# Patient Record
Sex: FEMALE | Race: WHITE | NOT HISPANIC OR LATINO
[De-identification: names, ages, dates, MRNs, and addresses within clinical notes are randomized per-mention and may not be internally consistent; named-entity substitution may affect disease eponyms.]

---

## 2017-01-10 ENCOUNTER — APPOINTMENT (OUTPATIENT)
Dept: UROLOGY | Facility: CLINIC | Age: 82
End: 2017-01-10

## 2017-01-10 VITALS
HEART RATE: 75 BPM | SYSTOLIC BLOOD PRESSURE: 159 MMHG | TEMPERATURE: 97.8 F | WEIGHT: 120 LBS | DIASTOLIC BLOOD PRESSURE: 79 MMHG | BODY MASS INDEX: 22.08 KG/M2 | HEIGHT: 62 IN | RESPIRATION RATE: 17 BRPM

## 2017-01-10 DIAGNOSIS — Z85.828 PERSONAL HISTORY OF OTHER MALIGNANT NEOPLASM OF SKIN: ICD-10-CM

## 2017-01-10 DIAGNOSIS — F17.200 NICOTINE DEPENDENCE, UNSPECIFIED, UNCOMPLICATED: ICD-10-CM

## 2017-01-10 DIAGNOSIS — Z85.53 PERSONAL HISTORY OF MALIGNANT NEOPLASM OF RENAL PELVIS: ICD-10-CM

## 2017-01-10 DIAGNOSIS — Z85.528 PERSONAL HISTORY OF OTHER MALIGNANT NEOPLASM OF KIDNEY: ICD-10-CM

## 2017-01-10 DIAGNOSIS — R35.0 FREQUENCY OF MICTURITION: ICD-10-CM

## 2017-01-10 DIAGNOSIS — Z86.79 PERSONAL HISTORY OF OTHER DISEASES OF THE CIRCULATORY SYSTEM: ICD-10-CM

## 2017-01-12 LAB
APPEARANCE: CLEAR
BACTERIA: NEGATIVE
BILIRUBIN URINE: NEGATIVE
BLOOD URINE: NEGATIVE
COLOR: YELLOW
CORE LAB FLUID CYTOLOGY: NORMAL
GLUCOSE QUALITATIVE U: NORMAL MG/DL
HYALINE CASTS: 1 /LPF
KETONES URINE: NEGATIVE
LEUKOCYTE ESTERASE URINE: NEGATIVE
MICROSCOPIC-UA: NORMAL
NITRITE URINE: NEGATIVE
PH URINE: 6
PROTEIN URINE: NEGATIVE MG/DL
RED BLOOD CELLS URINE: 1 /HPF
SPECIFIC GRAVITY URINE: 1.01
SQUAMOUS EPITHELIAL CELLS: 2 /HPF
UROBILINOGEN URINE: NORMAL MG/DL
WHITE BLOOD CELLS URINE: 1 /HPF

## 2017-05-26 ENCOUNTER — APPOINTMENT (OUTPATIENT)
Dept: DERMATOLOGY | Facility: CLINIC | Age: 82
End: 2017-05-26

## 2017-07-26 ENCOUNTER — APPOINTMENT (OUTPATIENT)
Dept: DERMATOLOGY | Facility: CLINIC | Age: 82
End: 2017-07-26

## 2018-05-30 ENCOUNTER — APPOINTMENT (OUTPATIENT)
Dept: DERMATOLOGY | Facility: CLINIC | Age: 83
End: 2018-05-30
Payer: MEDICARE

## 2018-05-30 PROCEDURE — 17000 DESTRUCT PREMALG LESION: CPT

## 2018-05-30 PROCEDURE — 99213 OFFICE O/P EST LOW 20 MIN: CPT | Mod: 25

## 2018-09-18 ENCOUNTER — APPOINTMENT (OUTPATIENT)
Dept: PULMONOLOGY | Facility: CLINIC | Age: 83
End: 2018-09-18
Payer: MEDICARE

## 2018-09-18 VITALS — HEIGHT: 62 IN | BODY MASS INDEX: 20.8 KG/M2 | WEIGHT: 113 LBS

## 2018-09-18 VITALS — HEART RATE: 71 BPM | DIASTOLIC BLOOD PRESSURE: 72 MMHG | OXYGEN SATURATION: 96 % | SYSTOLIC BLOOD PRESSURE: 118 MMHG

## 2018-09-18 VITALS — RESPIRATION RATE: 16 BRPM

## 2018-09-18 DIAGNOSIS — Z03.89 ENCOUNTER FOR OBSERVATION FOR OTHER SUSPECTED DISEASES AND CONDITIONS RULED OUT: ICD-10-CM

## 2018-09-18 PROCEDURE — 94729 DIFFUSING CAPACITY: CPT

## 2018-09-18 PROCEDURE — 94664 DEMO&/EVAL PT USE INHALER: CPT | Mod: 59

## 2018-09-18 PROCEDURE — 94727 GAS DIL/WSHOT DETER LNG VOL: CPT

## 2018-09-18 PROCEDURE — 94060 EVALUATION OF WHEEZING: CPT

## 2018-09-18 PROCEDURE — 85018 HEMOGLOBIN: CPT | Mod: QW

## 2018-09-18 PROCEDURE — 99205 OFFICE O/P NEW HI 60 MIN: CPT | Mod: 25

## 2018-09-18 RX ORDER — NEBIVOLOL HYDROCHLORIDE 20 MG/1
20 TABLET ORAL
Refills: 0 | Status: DISCONTINUED | COMMUNITY
End: 2018-09-18

## 2018-09-21 ENCOUNTER — APPOINTMENT (OUTPATIENT)
Dept: NEUROLOGY | Facility: CLINIC | Age: 83
End: 2018-09-21

## 2018-11-12 ENCOUNTER — APPOINTMENT (OUTPATIENT)
Dept: NUCLEAR MEDICINE | Facility: CLINIC | Age: 83
End: 2018-11-12

## 2019-01-08 ENCOUNTER — OTHER (OUTPATIENT)
Age: 84
End: 2019-01-08

## 2019-02-19 ENCOUNTER — OUTPATIENT (OUTPATIENT)
Dept: OUTPATIENT SERVICES | Facility: HOSPITAL | Age: 84
LOS: 1 days | End: 2019-02-19
Payer: MEDICARE

## 2019-02-19 ENCOUNTER — APPOINTMENT (OUTPATIENT)
Dept: NUCLEAR MEDICINE | Facility: HOSPITAL | Age: 84
End: 2019-02-19
Payer: MEDICARE

## 2019-02-19 DIAGNOSIS — Z00.00 ENCOUNTER FOR GENERAL ADULT MEDICAL EXAMINATION WITHOUT ABNORMAL FINDINGS: ICD-10-CM

## 2019-02-19 PROCEDURE — 78597 LUNG PERFUSION DIFFERENTIAL: CPT | Mod: 26

## 2019-02-19 PROCEDURE — A9540: CPT

## 2019-02-19 PROCEDURE — 78597 LUNG PERFUSION DIFFERENTIAL: CPT

## 2019-06-21 ENCOUNTER — APPOINTMENT (OUTPATIENT)
Dept: DERMATOLOGY | Facility: CLINIC | Age: 84
End: 2019-06-21
Payer: MEDICARE

## 2019-06-21 PROCEDURE — 99214 OFFICE O/P EST MOD 30 MIN: CPT

## 2019-11-04 ENCOUNTER — APPOINTMENT (OUTPATIENT)
Dept: DERMATOLOGY | Facility: CLINIC | Age: 84
End: 2019-11-04

## 2019-11-06 ENCOUNTER — APPOINTMENT (OUTPATIENT)
Dept: DERMATOLOGY | Facility: CLINIC | Age: 84
End: 2019-11-06
Payer: MEDICARE

## 2019-11-06 PROCEDURE — 99214 OFFICE O/P EST MOD 30 MIN: CPT

## 2019-12-31 ENCOUNTER — INPATIENT (INPATIENT)
Facility: HOSPITAL | Age: 84
LOS: 4 days | Discharge: HOME HEALTH | DRG: 871 | End: 2020-01-05
Attending: INTERNAL MEDICINE | Admitting: INTERNAL MEDICINE
Payer: MEDICARE

## 2019-12-31 VITALS
DIASTOLIC BLOOD PRESSURE: 62 MMHG | OXYGEN SATURATION: 95 % | HEIGHT: 61 IN | TEMPERATURE: 98 F | SYSTOLIC BLOOD PRESSURE: 132 MMHG | RESPIRATION RATE: 16 BRPM | HEART RATE: 86 BPM | WEIGHT: 110.89 LBS

## 2019-12-31 DIAGNOSIS — J18.9 PNEUMONIA, UNSPECIFIED ORGANISM: ICD-10-CM

## 2019-12-31 LAB
ALBUMIN SERPL ELPH-MCNC: 2.3 G/DL — LOW (ref 3.3–5)
ALP SERPL-CCNC: 101 U/L — SIGNIFICANT CHANGE UP (ref 40–120)
ALT FLD-CCNC: 9 U/L — LOW (ref 12–78)
ANION GAP SERPL CALC-SCNC: 6 MMOL/L — SIGNIFICANT CHANGE UP (ref 5–17)
APPEARANCE UR: CLEAR — SIGNIFICANT CHANGE UP
APTT BLD: 29.4 SEC — SIGNIFICANT CHANGE UP (ref 27.5–36.3)
AST SERPL-CCNC: 14 U/L — LOW (ref 15–37)
BASOPHILS # BLD AUTO: 0.04 K/UL — SIGNIFICANT CHANGE UP (ref 0–0.2)
BASOPHILS NFR BLD AUTO: 0.6 % — SIGNIFICANT CHANGE UP (ref 0–2)
BILIRUB SERPL-MCNC: 0.4 MG/DL — SIGNIFICANT CHANGE UP (ref 0.2–1.2)
BILIRUB UR-MCNC: NEGATIVE — SIGNIFICANT CHANGE UP
BUN SERPL-MCNC: 9 MG/DL — SIGNIFICANT CHANGE UP (ref 7–23)
CALCIUM SERPL-MCNC: 9.1 MG/DL — SIGNIFICANT CHANGE UP (ref 8.5–10.1)
CHLORIDE SERPL-SCNC: 97 MMOL/L — SIGNIFICANT CHANGE UP (ref 96–108)
CO2 SERPL-SCNC: 25 MMOL/L — SIGNIFICANT CHANGE UP (ref 22–31)
COLOR SPEC: YELLOW — SIGNIFICANT CHANGE UP
CREAT SERPL-MCNC: 0.67 MG/DL — SIGNIFICANT CHANGE UP (ref 0.5–1.3)
DIFF PNL FLD: NEGATIVE — SIGNIFICANT CHANGE UP
EOSINOPHIL # BLD AUTO: 0.16 K/UL — SIGNIFICANT CHANGE UP (ref 0–0.5)
EOSINOPHIL NFR BLD AUTO: 2.6 % — SIGNIFICANT CHANGE UP (ref 0–6)
GLUCOSE SERPL-MCNC: 101 MG/DL — HIGH (ref 70–99)
GLUCOSE UR QL: NEGATIVE MG/DL — SIGNIFICANT CHANGE UP
HCT VFR BLD CALC: 28.8 % — LOW (ref 34.5–45)
HGB BLD-MCNC: 9.2 G/DL — LOW (ref 11.5–15.5)
IMM GRANULOCYTES NFR BLD AUTO: 0.3 % — SIGNIFICANT CHANGE UP (ref 0–1.5)
INR BLD: 1.17 RATIO — HIGH (ref 0.88–1.16)
KETONES UR-MCNC: NEGATIVE — SIGNIFICANT CHANGE UP
LACTATE SERPL-SCNC: 0.7 MMOL/L — SIGNIFICANT CHANGE UP (ref 0.7–2)
LEUKOCYTE ESTERASE UR-ACNC: ABNORMAL
LYMPHOCYTES # BLD AUTO: 0.34 K/UL — LOW (ref 1–3.3)
LYMPHOCYTES # BLD AUTO: 5.4 % — LOW (ref 13–44)
MCHC RBC-ENTMCNC: 27 PG — SIGNIFICANT CHANGE UP (ref 27–34)
MCHC RBC-ENTMCNC: 31.9 GM/DL — LOW (ref 32–36)
MCV RBC AUTO: 84.5 FL — SIGNIFICANT CHANGE UP (ref 80–100)
MONOCYTES # BLD AUTO: 0.67 K/UL — SIGNIFICANT CHANGE UP (ref 0–0.9)
MONOCYTES NFR BLD AUTO: 10.7 % — SIGNIFICANT CHANGE UP (ref 2–14)
NEUTROPHILS # BLD AUTO: 5.02 K/UL — SIGNIFICANT CHANGE UP (ref 1.8–7.4)
NEUTROPHILS NFR BLD AUTO: 80.4 % — HIGH (ref 43–77)
NITRITE UR-MCNC: NEGATIVE — SIGNIFICANT CHANGE UP
NT-PROBNP SERPL-SCNC: 944 PG/ML — HIGH (ref 0–450)
PH UR: 7 — SIGNIFICANT CHANGE UP (ref 5–8)
PLATELET # BLD AUTO: 212 K/UL — SIGNIFICANT CHANGE UP (ref 150–400)
POTASSIUM SERPL-MCNC: 3.7 MMOL/L — SIGNIFICANT CHANGE UP (ref 3.5–5.3)
POTASSIUM SERPL-SCNC: 3.7 MMOL/L — SIGNIFICANT CHANGE UP (ref 3.5–5.3)
PROT SERPL-MCNC: 6.3 GM/DL — SIGNIFICANT CHANGE UP (ref 6–8.3)
PROT UR-MCNC: NEGATIVE MG/DL — SIGNIFICANT CHANGE UP
PROTHROM AB SERPL-ACNC: 13.1 SEC — HIGH (ref 10–12.9)
RAPID RVP RESULT: SIGNIFICANT CHANGE UP
RBC # BLD: 3.41 M/UL — LOW (ref 3.8–5.2)
RBC # FLD: 14.6 % — HIGH (ref 10.3–14.5)
SODIUM SERPL-SCNC: 128 MMOL/L — LOW (ref 135–145)
SP GR SPEC: 1 — LOW (ref 1.01–1.02)
TROPONIN I SERPL-MCNC: 0.29 NG/ML — HIGH (ref 0.01–0.04)
TROPONIN I SERPL-MCNC: 0.32 NG/ML — HIGH (ref 0.01–0.04)
UROBILINOGEN FLD QL: NEGATIVE MG/DL — SIGNIFICANT CHANGE UP
WBC # BLD: 6.25 K/UL — SIGNIFICANT CHANGE UP (ref 3.8–10.5)
WBC # FLD AUTO: 6.25 K/UL — SIGNIFICANT CHANGE UP (ref 3.8–10.5)

## 2019-12-31 PROCEDURE — 85027 COMPLETE CBC AUTOMATED: CPT

## 2019-12-31 PROCEDURE — 94640 AIRWAY INHALATION TREATMENT: CPT

## 2019-12-31 PROCEDURE — 84484 ASSAY OF TROPONIN QUANT: CPT

## 2019-12-31 PROCEDURE — 80048 BASIC METABOLIC PNL TOTAL CA: CPT

## 2019-12-31 PROCEDURE — 84100 ASSAY OF PHOSPHORUS: CPT

## 2019-12-31 PROCEDURE — 83735 ASSAY OF MAGNESIUM: CPT

## 2019-12-31 PROCEDURE — 93306 TTE W/DOPPLER COMPLETE: CPT

## 2019-12-31 PROCEDURE — 80061 LIPID PANEL: CPT

## 2019-12-31 PROCEDURE — 85025 COMPLETE CBC W/AUTO DIFF WBC: CPT

## 2019-12-31 PROCEDURE — 87086 URINE CULTURE/COLONY COUNT: CPT

## 2019-12-31 PROCEDURE — 71046 X-RAY EXAM CHEST 2 VIEWS: CPT | Mod: 26

## 2019-12-31 PROCEDURE — 83036 HEMOGLOBIN GLYCOSYLATED A1C: CPT

## 2019-12-31 PROCEDURE — 97162 PT EVAL MOD COMPLEX 30 MIN: CPT | Mod: GP

## 2019-12-31 PROCEDURE — 97116 GAIT TRAINING THERAPY: CPT | Mod: GP

## 2019-12-31 PROCEDURE — 83010 ASSAY OF HAPTOGLOBIN QUANT: CPT

## 2019-12-31 PROCEDURE — 71250 CT THORAX DX C-: CPT

## 2019-12-31 PROCEDURE — 81001 URINALYSIS AUTO W/SCOPE: CPT

## 2019-12-31 PROCEDURE — 83540 ASSAY OF IRON: CPT

## 2019-12-31 PROCEDURE — 82746 ASSAY OF FOLIC ACID SERUM: CPT

## 2019-12-31 PROCEDURE — 93005 ELECTROCARDIOGRAM TRACING: CPT

## 2019-12-31 PROCEDURE — 85610 PROTHROMBIN TIME: CPT

## 2019-12-31 PROCEDURE — 80053 COMPREHEN METABOLIC PANEL: CPT

## 2019-12-31 PROCEDURE — 82607 VITAMIN B-12: CPT

## 2019-12-31 PROCEDURE — 36415 COLL VENOUS BLD VENIPUNCTURE: CPT

## 2019-12-31 PROCEDURE — 85730 THROMBOPLASTIN TIME PARTIAL: CPT

## 2019-12-31 PROCEDURE — 83550 IRON BINDING TEST: CPT

## 2019-12-31 PROCEDURE — 93010 ELECTROCARDIOGRAM REPORT: CPT

## 2019-12-31 PROCEDURE — 82668 ASSAY OF ERYTHROPOIETIN: CPT

## 2019-12-31 RX ORDER — ASPIRIN/CALCIUM CARB/MAGNESIUM 324 MG
325 TABLET ORAL ONCE
Refills: 0 | Status: COMPLETED | OUTPATIENT
Start: 2019-12-31 | End: 2019-12-31

## 2019-12-31 RX ORDER — SODIUM CHLORIDE 9 MG/ML
1000 INJECTION INTRAMUSCULAR; INTRAVENOUS; SUBCUTANEOUS
Refills: 0 | Status: DISCONTINUED | OUTPATIENT
Start: 2019-12-31 | End: 2020-01-03

## 2019-12-31 RX ORDER — IPRATROPIUM/ALBUTEROL SULFATE 18-103MCG
3 AEROSOL WITH ADAPTER (GRAM) INHALATION EVERY 6 HOURS
Refills: 0 | Status: DISCONTINUED | OUTPATIENT
Start: 2019-12-31 | End: 2020-01-01

## 2019-12-31 RX ORDER — HYDRALAZINE HCL 50 MG
25 TABLET ORAL
Refills: 0 | Status: DISCONTINUED | OUTPATIENT
Start: 2019-12-31 | End: 2020-01-05

## 2019-12-31 RX ORDER — PANTOPRAZOLE SODIUM 20 MG/1
1 TABLET, DELAYED RELEASE ORAL
Qty: 0 | Refills: 0 | DISCHARGE

## 2019-12-31 RX ORDER — AZELASTINE 137 UG/1
1 SPRAY, METERED NASAL
Qty: 0 | Refills: 0 | DISCHARGE

## 2019-12-31 RX ORDER — SODIUM CHLORIDE 9 MG/ML
1500 INJECTION INTRAMUSCULAR; INTRAVENOUS; SUBCUTANEOUS ONCE
Refills: 0 | Status: COMPLETED | OUTPATIENT
Start: 2019-12-31 | End: 2019-12-31

## 2019-12-31 RX ORDER — ACETAMINOPHEN 500 MG
650 TABLET ORAL EVERY 6 HOURS
Refills: 0 | Status: DISCONTINUED | OUTPATIENT
Start: 2019-12-31 | End: 2020-01-05

## 2019-12-31 RX ORDER — LISINOPRIL 2.5 MG/1
1 TABLET ORAL
Qty: 0 | Refills: 0 | DISCHARGE

## 2019-12-31 RX ORDER — ASPIRIN/CALCIUM CARB/MAGNESIUM 324 MG
1 TABLET ORAL
Qty: 0 | Refills: 0 | DISCHARGE

## 2019-12-31 RX ORDER — ASPIRIN/CALCIUM CARB/MAGNESIUM 324 MG
81 TABLET ORAL DAILY
Refills: 0 | Status: DISCONTINUED | OUTPATIENT
Start: 2019-12-31 | End: 2020-01-05

## 2019-12-31 RX ORDER — HYDRALAZINE HCL 50 MG
1 TABLET ORAL
Qty: 0 | Refills: 0 | DISCHARGE

## 2019-12-31 RX ORDER — PANTOPRAZOLE SODIUM 20 MG/1
40 TABLET, DELAYED RELEASE ORAL
Refills: 0 | Status: DISCONTINUED | OUTPATIENT
Start: 2019-12-31 | End: 2020-01-05

## 2019-12-31 RX ORDER — ALPRAZOLAM 0.25 MG
0.5 TABLET ORAL ONCE
Refills: 0 | Status: DISCONTINUED | OUTPATIENT
Start: 2019-12-31 | End: 2020-01-01

## 2019-12-31 RX ORDER — LISINOPRIL 2.5 MG/1
20 TABLET ORAL DAILY
Refills: 0 | Status: DISCONTINUED | OUTPATIENT
Start: 2019-12-31 | End: 2020-01-05

## 2019-12-31 RX ADMIN — SODIUM CHLORIDE 1500 MILLILITER(S): 9 INJECTION INTRAMUSCULAR; INTRAVENOUS; SUBCUTANEOUS at 15:40

## 2019-12-31 RX ADMIN — SODIUM CHLORIDE 75 MILLILITER(S): 9 INJECTION INTRAMUSCULAR; INTRAVENOUS; SUBCUTANEOUS at 21:05

## 2019-12-31 RX ADMIN — SODIUM CHLORIDE 1500 MILLILITER(S): 9 INJECTION INTRAMUSCULAR; INTRAVENOUS; SUBCUTANEOUS at 18:02

## 2019-12-31 RX ADMIN — Medication 325 MILLIGRAM(S): at 18:02

## 2019-12-31 NOTE — H&P ADULT - ASSESSMENT
83 y/o F with PMH of HTN, CVA, renal cancer s/p nephrectomy, lung cancer s/p chemo (last chemo was 5/21/2019 and last immunotherapy was 11/7/19), COPD, p/w cough    *Sepsis (fever of 101.7 at home and RR >30) 2/2 Community acquired PNA  -Ceftriaxone / zithromax  -F/u blood cultures  -RVP negative  -IVF   -Duonebs for COPD    *Microcytic anemia  -No signs / symptoms of bleeding  -F/u outpatient for further work up    *Hyponatremia  -IVF and re-check Na     *HTN / CVA  -C/w home meds and f/u outpatient for further management        *DVT ppx  -Lovenox 83 y/o F with PMH of HTN, CVA, renal cancer s/p nephrectomy, lung cancer s/p chemo (last chemo was 5/21/2019 and last immunotherapy was 11/7/19), COPD, p/w cough    *Sepsis (fever of 101.7 at home and RR >30) 2/2 Community acquired PNA  -Ceftriaxone / zithromax  -F/u blood cultures  -RVP negative  -IVF   -Duonebs for COPD    *Elevated troponins - R/o ACS   -Denies CP   -Possibly 2/2 demand ischemia   -Will trend troponin, if #2 trends up significantly -> will consider starting anticoagulation, will monitor for now  -Cardio consult  -Echo  -S/p ASA  -Tele monitoring     *Microcytic anemia  -No signs / symptoms of bleeding  -F/u outpatient for further work up    *Hyponatremia  -IVF and re-check Na     *HTN / CVA  -C/w home meds and f/u outpatient for further management        *DVT ppx  -Lovenox

## 2019-12-31 NOTE — ED PROVIDER NOTE - PROGRESS NOTE DETAILS
I Mercedes Foley attest that this documentation has been prepared under the direction and in the presence of Doctor Frost.

## 2019-12-31 NOTE — ED PROVIDER NOTE - OBJECTIVE STATEMENT
83 y/o f with PMHx of HTN, CVA, lung CA s/p Chemo, currently on immunotherapy (last 11/7) presenting to the ED BIBA c/o cough, fever x4 days. Tmax of 101.7 x2 days ago. Cough productive of yellow sputum. Pt states she feels like she is dehydrated. Pt seen at Dr. Kong's office today and sent to ED for further eval. Denies weakness, n/v/d, abd pain. PCP: Dr. Lilly in [place]. Former smoker. Pt received this season's influenza shot. Pt currently on Lisinopril 20mg daily, Hydralazine HCL 25mg BID, Alprazolam 0.5mg TID, Pantoprazole 40mg daily. Allergic to Penicillin, IV contrast, Sulfur, extreme sensitivity to Pain meds, antihistamines, steroids.

## 2019-12-31 NOTE — ED PROVIDER NOTE - CARE PLAN
Principal Discharge DX:	CAP (community acquired pneumonia) Principal Discharge DX:	CAP (community acquired pneumonia)  Secondary Diagnosis:	Troponin I above reference range

## 2019-12-31 NOTE — ED ADULT NURSE REASSESSMENT NOTE - NS ED NURSE REASSESS COMMENT FT1
Assumed care of patient from KLAUS Fatima at 2250. Patient AxOx4. Patient resting comfortably in bed, denies pain at this time. Family at bedside. Patient in no acute signs of distress. All needs addressed at this time. Safety and comfort maintained. Will continue to monitor.

## 2019-12-31 NOTE — ED PROVIDER NOTE - CLINICAL SUMMARY MEDICAL DECISION MAKING FREE TEXT BOX
Pt with hx of lung CA, here with cough, fever and weakness since Sunday, plan for labs, CXR, hydration.

## 2019-12-31 NOTE — H&P ADULT - HISTORY OF PRESENT ILLNESS
83 y/o F with PMH of HTN, CVA, renal cancer s/p nephrectomy, lung cancer s/p chemo (last chemo was 5/21/2019 and last immunotherapy was 11/7/19), COPD, p/w cough. Cough started 4 days ago and is productive of yellow sputum. Also c/o sore throat and chills. Had a tmax of 101.7 at home. Has mild SOB intermittently and some palpitations. Denies CP, nausea, vomiting, diaphoresis, dysuria, increased frequency    States she took baby ASA at home, and was given more ASA in ED.    PSH: Nephrectomy    Social Hx: Former smoker, denies etoh / drugs, lives at home with  and daughter    Family Hx: Sister - lung cancer

## 2019-12-31 NOTE — ED ADULT NURSE NOTE - OBJECTIVE STATEMENT
Pt BIBEMS from Dr Kong's office for flu-like symptoms, fever, weakness since sunday. pt has a hx of lung CA

## 2020-01-01 LAB
ALBUMIN SERPL ELPH-MCNC: 2.2 G/DL — LOW (ref 3.3–5)
ALP SERPL-CCNC: 96 U/L — SIGNIFICANT CHANGE UP (ref 40–120)
ALT FLD-CCNC: 11 U/L — LOW (ref 12–78)
ANION GAP SERPL CALC-SCNC: 5 MMOL/L — SIGNIFICANT CHANGE UP (ref 5–17)
APTT BLD: 27.4 SEC — LOW (ref 27.5–36.3)
AST SERPL-CCNC: 10 U/L — LOW (ref 15–37)
BASOPHILS # BLD AUTO: 0.04 K/UL — SIGNIFICANT CHANGE UP (ref 0–0.2)
BASOPHILS NFR BLD AUTO: 0.8 % — SIGNIFICANT CHANGE UP (ref 0–2)
BILIRUB SERPL-MCNC: 0.3 MG/DL — SIGNIFICANT CHANGE UP (ref 0.2–1.2)
BUN SERPL-MCNC: 6 MG/DL — LOW (ref 7–23)
CALCIUM SERPL-MCNC: 9.1 MG/DL — SIGNIFICANT CHANGE UP (ref 8.5–10.1)
CHLORIDE SERPL-SCNC: 107 MMOL/L — SIGNIFICANT CHANGE UP (ref 96–108)
CHOLEST SERPL-MCNC: 179 MG/DL — SIGNIFICANT CHANGE UP (ref 10–199)
CO2 SERPL-SCNC: 24 MMOL/L — SIGNIFICANT CHANGE UP (ref 22–31)
CREAT SERPL-MCNC: 0.63 MG/DL — SIGNIFICANT CHANGE UP (ref 0.5–1.3)
CULTURE RESULTS: SIGNIFICANT CHANGE UP
EOSINOPHIL # BLD AUTO: 0.1 K/UL — SIGNIFICANT CHANGE UP (ref 0–0.5)
EOSINOPHIL NFR BLD AUTO: 1.9 % — SIGNIFICANT CHANGE UP (ref 0–6)
GLUCOSE SERPL-MCNC: 86 MG/DL — SIGNIFICANT CHANGE UP (ref 70–99)
HBA1C BLD-MCNC: 5.3 % — SIGNIFICANT CHANGE UP (ref 4–5.6)
HCT VFR BLD CALC: 30 % — LOW (ref 34.5–45)
HDLC SERPL-MCNC: 41 MG/DL — LOW
HGB BLD-MCNC: 9.5 G/DL — LOW (ref 11.5–15.5)
IMM GRANULOCYTES NFR BLD AUTO: 0.6 % — SIGNIFICANT CHANGE UP (ref 0–1.5)
INR BLD: 1.27 RATIO — HIGH (ref 0.88–1.16)
LIPID PNL WITH DIRECT LDL SERPL: 123 MG/DL — HIGH
LYMPHOCYTES # BLD AUTO: 0.25 K/UL — LOW (ref 1–3.3)
LYMPHOCYTES # BLD AUTO: 4.8 % — LOW (ref 13–44)
MAGNESIUM SERPL-MCNC: 2 MG/DL — SIGNIFICANT CHANGE UP (ref 1.6–2.6)
MCHC RBC-ENTMCNC: 27.2 PG — SIGNIFICANT CHANGE UP (ref 27–34)
MCHC RBC-ENTMCNC: 31.7 GM/DL — LOW (ref 32–36)
MCV RBC AUTO: 86 FL — SIGNIFICANT CHANGE UP (ref 80–100)
MONOCYTES # BLD AUTO: 0.59 K/UL — SIGNIFICANT CHANGE UP (ref 0–0.9)
MONOCYTES NFR BLD AUTO: 11.3 % — SIGNIFICANT CHANGE UP (ref 2–14)
NEUTROPHILS # BLD AUTO: 4.19 K/UL — SIGNIFICANT CHANGE UP (ref 1.8–7.4)
NEUTROPHILS NFR BLD AUTO: 80.6 % — HIGH (ref 43–77)
PHOSPHATE SERPL-MCNC: 2.9 MG/DL — SIGNIFICANT CHANGE UP (ref 2.5–4.5)
PLATELET # BLD AUTO: 237 K/UL — SIGNIFICANT CHANGE UP (ref 150–400)
POTASSIUM SERPL-MCNC: 3.5 MMOL/L — SIGNIFICANT CHANGE UP (ref 3.5–5.3)
POTASSIUM SERPL-SCNC: 3.5 MMOL/L — SIGNIFICANT CHANGE UP (ref 3.5–5.3)
PROT SERPL-MCNC: 5.8 GM/DL — LOW (ref 6–8.3)
PROTHROM AB SERPL-ACNC: 14.2 SEC — HIGH (ref 10–12.9)
RBC # BLD: 3.49 M/UL — LOW (ref 3.8–5.2)
RBC # FLD: 14.7 % — HIGH (ref 10.3–14.5)
SODIUM SERPL-SCNC: 136 MMOL/L — SIGNIFICANT CHANGE UP (ref 135–145)
SPECIMEN SOURCE: SIGNIFICANT CHANGE UP
TOTAL CHOLESTEROL/HDL RATIO MEASUREMENT: 4.4 RATIO — SIGNIFICANT CHANGE UP (ref 3.3–7.1)
TRIGL SERPL-MCNC: 73 MG/DL — SIGNIFICANT CHANGE UP (ref 10–149)
TROPONIN I SERPL-MCNC: 0.1 NG/ML — HIGH (ref 0.01–0.04)
TROPONIN I SERPL-MCNC: 0.11 NG/ML — HIGH (ref 0.01–0.04)
TROPONIN I SERPL-MCNC: 0.3 NG/ML — HIGH (ref 0.01–0.04)
WBC # BLD: 5.2 K/UL — SIGNIFICANT CHANGE UP (ref 3.8–10.5)
WBC # FLD AUTO: 5.2 K/UL — SIGNIFICANT CHANGE UP (ref 3.8–10.5)

## 2020-01-01 PROCEDURE — 71250 CT THORAX DX C-: CPT | Mod: 26

## 2020-01-01 PROCEDURE — 93010 ELECTROCARDIOGRAM REPORT: CPT

## 2020-01-01 RX ORDER — CEFEPIME 1 G/1
INJECTION, POWDER, FOR SOLUTION INTRAMUSCULAR; INTRAVENOUS
Refills: 0 | Status: DISCONTINUED | OUTPATIENT
Start: 2020-01-01 | End: 2020-01-02

## 2020-01-01 RX ORDER — IPRATROPIUM BROMIDE 0.2 MG/ML
500 SOLUTION, NON-ORAL INHALATION EVERY 6 HOURS
Refills: 0 | Status: DISCONTINUED | OUTPATIENT
Start: 2020-01-01 | End: 2020-01-05

## 2020-01-01 RX ORDER — ATORVASTATIN CALCIUM 80 MG/1
10 TABLET, FILM COATED ORAL AT BEDTIME
Refills: 0 | Status: DISCONTINUED | OUTPATIENT
Start: 2020-01-01 | End: 2020-01-04

## 2020-01-01 RX ORDER — CEFEPIME 1 G/1
INJECTION, POWDER, FOR SOLUTION INTRAMUSCULAR; INTRAVENOUS
Refills: 0 | Status: DISCONTINUED | OUTPATIENT
Start: 2020-01-01 | End: 2020-01-01

## 2020-01-01 RX ORDER — CEFEPIME 1 G/1
500 INJECTION, POWDER, FOR SOLUTION INTRAMUSCULAR; INTRAVENOUS ONCE
Refills: 0 | Status: COMPLETED | OUTPATIENT
Start: 2020-01-01 | End: 2020-01-01

## 2020-01-01 RX ORDER — AZITHROMYCIN 500 MG/1
500 TABLET, FILM COATED ORAL DAILY
Refills: 0 | Status: COMPLETED | OUTPATIENT
Start: 2020-01-01 | End: 2020-01-03

## 2020-01-01 RX ORDER — CEFEPIME 1 G/1
500 INJECTION, POWDER, FOR SOLUTION INTRAMUSCULAR; INTRAVENOUS EVERY 12 HOURS
Refills: 0 | Status: DISCONTINUED | OUTPATIENT
Start: 2020-01-02 | End: 2020-01-02

## 2020-01-01 RX ORDER — ALPRAZOLAM 0.25 MG
0.25 TABLET ORAL THREE TIMES A DAY
Refills: 0 | Status: DISCONTINUED | OUTPATIENT
Start: 2020-01-01 | End: 2020-01-04

## 2020-01-01 RX ORDER — CEFEPIME 1 G/1
500 INJECTION, POWDER, FOR SOLUTION INTRAMUSCULAR; INTRAVENOUS ONCE
Refills: 0 | Status: DISCONTINUED | OUTPATIENT
Start: 2020-01-01 | End: 2020-01-01

## 2020-01-01 RX ADMIN — PANTOPRAZOLE SODIUM 40 MILLIGRAM(S): 20 TABLET, DELAYED RELEASE ORAL at 05:53

## 2020-01-01 RX ADMIN — Medication 0.25 MILLIGRAM(S): at 16:19

## 2020-01-01 RX ADMIN — CEFEPIME 100 MILLIGRAM(S): 1 INJECTION, POWDER, FOR SOLUTION INTRAMUSCULAR; INTRAVENOUS at 14:37

## 2020-01-01 RX ADMIN — Medication 0.25 MILLIGRAM(S): at 21:47

## 2020-01-01 RX ADMIN — Medication 30 MILLILITER(S): at 16:50

## 2020-01-01 RX ADMIN — ATORVASTATIN CALCIUM 10 MILLIGRAM(S): 80 TABLET, FILM COATED ORAL at 21:48

## 2020-01-01 RX ADMIN — LISINOPRIL 20 MILLIGRAM(S): 2.5 TABLET ORAL at 05:53

## 2020-01-01 RX ADMIN — Medication 25 MILLIGRAM(S): at 05:54

## 2020-01-01 RX ADMIN — Medication 3 MILLILITER(S): at 15:38

## 2020-01-01 RX ADMIN — Medication 25 MILLIGRAM(S): at 00:11

## 2020-01-01 RX ADMIN — Medication 81 MILLIGRAM(S): at 13:32

## 2020-01-01 RX ADMIN — Medication 0.25 MILLIGRAM(S): at 09:54

## 2020-01-01 RX ADMIN — Medication 0.5 MILLIGRAM(S): at 00:12

## 2020-01-01 RX ADMIN — Medication 25 MILLIGRAM(S): at 16:50

## 2020-01-01 NOTE — CONSULT NOTE ADULT - ASSESSMENT
A/P: 85 y/o F with PMH of HTN, CVA, renal cancer s/p nephrectomy, lung cancer s/p chemo (last chemo was 5/21/2019 and last immunotherapy was 11/7/19),   COPD, p/w cough.    1. PNA/sepsis. Cont abx. Cx pending. RVP negative.   Encourage fluids. Mgmt as per primary team.     2. +Troponins. Likely demand ischemia in setting of PNA.   No active CP or dynamic EKG changes.   Cont asa. Tele monitoring for 24 hrs.   Avoid Bbl with COPD. Check lipids.   Can check 2Decho to assess LV fxn.   Will have outpt ischemic eval when more stable, after infection cleared.     3. HTN. Stable, cont current regimen.     4. H/o CVA. Cont asa. Would also start low dose statin.    5. DVT proph.

## 2020-01-01 NOTE — CONSULT NOTE ADULT - SUBJECTIVE AND OBJECTIVE BOX
Cardiology Consultation    HPI: 83 y/o F with PMH of HTN, CVA, renal cancer s/p nephrectomy, lung cancer s/p chemo (last chemo was 2019 and last immunotherapy was 19), COPD, p/w cough. Cough started 4 days ago and is productive of yellow sputum. Also c/o sore throat and chills. Had a tmax of 101.7 at home. Has mild SOB intermittently and some palpitations. Denies CP, nausea, vomiting, diaphoresis, dysuria, increased frequency. States she took baby ASA at home, and was given more ASA in ED.    . Case d/w daughter and pt at bedside. +Cough, SOB, fevers. No CP.   SR on tele.     PSH: Nephrectomy    Social Hx: Former smoker, denies etoh / drugs, lives at home with  and daughter    Family Hx: Sister - lung cancer (31 Dec 2019 20:12)    Allergies  antihistamines (Unknown)  IV Contrast (Unknown)  PC Pen VK (Unknown)  sulfa drugs (Unknown)    SOCIAL HISTORY: Denies tobacco, etoh abuse or illicit drug use    MEDICATIONS  (STANDING):  albuterol/ipratropium for Nebulization 3 milliLiter(s) Nebulizer every 6 hours  aspirin enteric coated 81 milliGRAM(s) Oral daily  hydrALAZINE 25 milliGRAM(s) Oral two times a day  levoFLOXacin IVPB 500 milliGRAM(s) IV Intermittent every 24 hours  lisinopril 20 milliGRAM(s) Oral daily  pantoprazole    Tablet 40 milliGRAM(s) Oral before breakfast  sodium chloride 0.9%. 1000 milliLiter(s) (75 mL/Hr) IV Continuous <Continuous>    MEDICATIONS  (PRN):  acetaminophen   Tablet .. 650 milliGRAM(s) Oral every 6 hours PRN Mild Pain (1 - 3)  ALPRAZolam 0.25 milliGRAM(s) Oral three times a day PRN anxiety  artificial  tears Solution 1 Drop(s) Both EYES three times a day PRN Dry Eyes      Vital Signs Last 24 Hrs  T(C): 36.7 (2020 05:33), Max: 37.9 (31 Dec 2019 23:45)  T(F): 98.1 (2020 05:33), Max: 100.2 (31 Dec 2019 23:45)  HR: 90 (2020 05:33) (71 - 101)  BP: 125/53 (2020 05:33) (123/55 - 152/57)  BP(mean): 77 (2020 01:50) (77 - 77)  RR: 15 (2020 05:33) (15 - 21)  SpO2: 99% (2020 05:33) (92% - 100%)    REVIEW OF SYSTEMS:    CONSTITUTIONAL:  As per HPI.  HEENT:  Eyes:  No diplopia or blurred vision. ENT:  No earache, sore throat or runny nose.  CARDIOVASCULAR:  No pressure, squeezing, strangling, tightness, heaviness or aching about the chest, neck, axilla or epigastrium.  RESPIRATORY:  +cough, SOB.  GASTROINTESTINAL:  No nausea, vomiting or diarrhea.  GENITOURINARY:  No dysuria, frequency or urgency.  MUSCULOSKELETAL:  As per HPI.  NEUROLOGIC:  No paresthesias, fasciculations, seizures or weakness.    PHYSICAL EXAMINATION:    GENERAL APPEARANCE:  Pt. is not currently dyspneic, in no distress. Pt. is alert, oriented, and pleasant.  HEENT:  Pupils are normal and react normally. No icterus. Mucous membranes well colored.  NECK:  Supple. No lymphadenopathy. Jugular venous pressure not elevated. Carotids equal.   HEART:   The cardiac impulse has a normal quality. There are no murmurs, rubs or gallops noted  CHEST:  Decreased BS b/l.   ABDOMEN:  Soft and nontender.   EXTREMITIES:  There is no edema.     LABS:                        9.5    5.20  )-----------( 237      ( 2020 07:46 )             30.0         136  |  107  |  6<L>  ----------------------------<  86  3.5   |  24  |  0.63    Ca    9.1      2020 07:46  Phos  2.9       Mg     2.0         TPro  5.8<L>  /  Alb  2.2<L>  /  TBili  0.3  /  DBili  x   /  AST  10<L>  /  ALT  11<L>  /  AlkPhos  96      LIVER FUNCTIONS - ( 2020 07:46 )  Alb: 2.2 g/dL / Pro: 5.8 gm/dL / ALK PHOS: 96 U/L / ALT: 11 U/L / AST: 10 U/L / GGT: x           PT/INR - ( 2020 07:46 )   PT: 14.2 sec;   INR: 1.27 ratio       PTT - ( 2020 07:46 )  PTT:27.4 sec  CARDIAC MARKERS ( 31 Dec 2019 23:05 )  0.299 ng/mL / x     / x     / x     / x      CARDIAC MARKERS ( 31 Dec 2019 19:57 )  0.287 ng/mL / x     / x     / x     / x      CARDIAC MARKERS ( 31 Dec 2019 15:40 )  0.316 ng/mL / x     / x     / x     / x        Urinalysis Basic - ( 31 Dec 2019 18:05 )    Color: Yellow / Appearance: Clear / S.005 / pH: x  Gluc: x / Ketone: Negative  / Bili: Negative / Urobili: Negative mg/dL   Blood: x / Protein: Negative mg/dL / Nitrite: Negative   Leuk Esterase: Trace / RBC: 0-2 /HPF / WBC 0-2   Sq Epi: x / Non Sq Epi: x / Bacteria: x    EKG: SR @ 82, no dynamic ST changes.     TELEMETRY: SR    CARDIAC TESTS: pending    RADIOLOGY & ADDITIONAL STUDIES:   < from: Xray Chest 2 Views PA/Lat (19 @ 15:31) >  IMPRESSION: Posterior infiltrate, possibly inthe left lower lobe.    ASSESSMENT & PLAN:

## 2020-01-01 NOTE — CONSULT NOTE ADULT - SUBJECTIVE AND OBJECTIVE BOX
HPI:    84 y.o.f with PMH of HTN, CVA, renal cancer s/p nephrectomy, lung cancer s/p chemo (last chemo was 2019 and last immunotherapy was 19), COPD, pat admitted with cough which started 4 days ago and is productive of yellow sputum. Also c/o sore throat and chills. Had a tmax of 101.7 at home. Has mild SOB intermittently and some palpitations. Denies CP, nausea, vomiting, diaphoresis, dysuria, increased frequency, pat seen for pulmonary, pat usually fu with pulmonologist in [place].    PSH: Nephrectomy    Social Hx: Former smoker, denies etoh / drugs, lives at home with  and daughter    Family Hx: Sister - lung cancer (31 Dec 2019 20:12)      PAST MEDICAL & SURGICAL HISTORY:      Home Medications:  ALPRAZolam 0.5 mg oral tablet: 1 tab(s) orally 3 times a day (31 Dec 2019 20:13)  Aspir 81 oral delayed release tablet: 1 tab(s) orally once a day (31 Dec 2019 20:13)  azelastine 137 mcg/inh (0.1%) nasal spray: 1 spray(s) nasal 2 times a day, As Needed (31 Dec 2019 20:13)  hydrALAZINE 25 mg oral tablet: 1 tab(s) orally 2 times a day (31 Dec 2019 20:13)  lisinopril 20 mg oral tablet: 1 tab(s) orally once a day (31 Dec 2019 20:13)  pantoprazole 40 mg oral delayed release tablet: 1 tab(s) orally once a day (31 Dec 2019 20:13)  Refresh ophthalmic solution: 1 drop(s) to each affected eye 3 times a day, As Needed (31 Dec 2019 20:13)      MEDICATIONS  (STANDING):  albuterol/ipratropium for Nebulization 3 milliLiter(s) Nebulizer every 6 hours  aspirin enteric coated 81 milliGRAM(s) Oral daily  atorvastatin 10 milliGRAM(s) Oral at bedtime  cefepime   IVPB      cefepime   IVPB 500 milliGRAM(s) IV Intermittent once  hydrALAZINE 25 milliGRAM(s) Oral two times a day  lisinopril 20 milliGRAM(s) Oral daily  pantoprazole    Tablet 40 milliGRAM(s) Oral before breakfast  sodium chloride 0.9%. 1000 milliLiter(s) (75 mL/Hr) IV Continuous <Continuous>    MEDICATIONS  (PRN):  acetaminophen   Tablet .. 650 milliGRAM(s) Oral every 6 hours PRN Mild Pain (1 - 3)  ALPRAZolam 0.25 milliGRAM(s) Oral three times a day PRN anxiety  artificial  tears Solution 1 Drop(s) Both EYES three times a day PRN Dry Eyes      Allergies    antihistamines (Unknown)  IV Contrast (Unknown)  PC Pen VK (Unknown)  sulfa drugs (Unknown)    Intolerances    predniSONE (Unknown)      SOCIAL HISTORY: Denies tobacco, etoh abuse or illicit drug use    FAMILY HISTORY:      Vital Signs Last 24 Hrs  T(C): 37.1 (2020 11:08), Max: 37.9 (31 Dec 2019 23:45)  T(F): 98.7 (2020 11:08), Max: 100.2 (31 Dec 2019 23:45)  HR: 102 (2020 11:08) (71 - 102)  BP: 130/54 (2020 11:08) (123/55 - 152/57)  BP(mean): 77 (2020 01:50) (77 - 77)  RR: 18 (2020 11:08) (15 - 21)  SpO2: 99% (2020 11:08) (92% - 100%)        REVIEW OF SYSTEMS:    CONSTITUTIONAL:  As per HPI.  HEENT:  Eyes:  No diplopia or blurred vision. ENT:  No earache, sore throat or runny nose.  CARDIOVASCULAR:  No pressure, squeezing, tightness, heaviness or aching about the chest, neck, axilla or epigastrium.  RESPIRATORY:  No cough, shortness of breath, PND or orthopnea.  GASTROINTESTINAL:  No nausea, vomiting or diarrhea.  GENITOURINARY:  No dysuria, frequency or urgency.  MUSCULOSKELETAL:  As per HPI.  SKIN:  No change in skin, hair or nails.  NEUROLOGIC:  No paresthesias, fasciculations, seizures or weakness.  PSYCHIATRIC:  No disorder of thought or mood.  ENDOCRINE:  No heat or cold intolerance, polyuria or polydipsia.  HEMATOLOGICAL:  No easy bruising or bleedings:  .     PHYSICAL EXAMINATION:    GENERAL APPEARANCE:  Pt. is not currently dyspneic, in no distress. Pt. is alert, oriented, and pleasant.  HEENT:  Pupils are normal and react normally. No icterus. Mucous membranes well colored.  NECK:  Supple. No lymphadenopathy. Jugular venous pressure not elevated. Carotids equal.   HEART:   The cardiac impulse has a normal quality. Regular. Normal S1 and S2. There are no murmurs, rubs or gallops noted  CHEST:  Chest crackles to auscultation. Normal respiratory effort.  ABDOMEN:  Soft and nontender.   EXTREMITIES:  There is no cyanosis, clubbing or edema.   SKIN:  No rash or significant lesions are noted.    LABS:                        9.5    5.20  )-----------( 237      ( 2020 07:46 )             30.0         136  |  107  |  6<L>  ----------------------------<  86  3.5   |  24  |  0.63    Ca    9.1      2020 07:46  Phos  2.9       Mg     2.0         TPro  5.8<L>  /  Alb  2.2<L>  /  TBili  0.3  /  DBili  x   /  AST  10<L>  /  ALT  11<L>  /  AlkPhos  96      LIVER FUNCTIONS - ( 2020 07:46 )  Alb: 2.2 g/dL / Pro: 5.8 gm/dL / ALK PHOS: 96 U/L / ALT: 11 U/L / AST: 10 U/L / GGT: x           PT/INR - ( 2020 07:46 )   PT: 14.2 sec;   INR: 1.27 ratio         PTT - ( 2020 07:46 )  PTT:27.4 sec  CARDIAC MARKERS ( 31 Dec 2019 23:05 )  0.299 ng/mL / x     / x     / x     / x      CARDIAC MARKERS ( 31 Dec 2019 19:57 )  0.287 ng/mL / x     / x     / x     / x      CARDIAC MARKERS ( 31 Dec 2019 15:40 )  0.316 ng/mL / x     / x     / x     / x          Urinalysis Basic - ( 31 Dec 2019 18:05 )    Color: Yellow / Appearance: Clear / S.005 / pH: x  Gluc: x / Ketone: Negative  / Bili: Negative / Urobili: Negative mg/dL   Blood: x / Protein: Negative mg/dL / Nitrite: Negative   Leuk Esterase: Trace / RBC: 0-2 /HPF / WBC 0-2   Sq Epi: x / Non Sq Epi: x / Bacteria: x    RADIOLOGY & ADDITIONAL STUDIES:     Chest 2 Views PA/Lat (12.31.19 @ 15:31) >  IMPRESSION: Posterior infiltrate, possibly inthe left lower lobe.

## 2020-01-01 NOTE — PROGRESS NOTE ADULT - SUBJECTIVE AND OBJECTIVE BOX
HOSPITALIST PROGRESS NOTE:  SUBJECTIVE:  PCP:  Chief Complaint: Patient is a 84y old  Female who presents with a chief complaint of cough (31 Dec 2019 20:12)      HPI:  83 y/o F with PMH of HTN, CVA, renal cancer s/p nephrectomy, lung cancer s/p chemo (last chemo was 2019 and last immunotherapy was 19), COPD, p/w cough. Cough started 4 days ago and is productive of yellow sputum. Also c/o sore throat and chills. Had a tmax of 101.7 at home. Has mild SOB intermittently and some palpitations. Denies CP, nausea, vomiting, diaphoresis, dysuria, increased frequency  States she took baby ASA at home, and was given more ASA in ED.    20: Above reviewed.     Allergies:  antihistamines (Unknown)  IV Contrast (Unknown)  PC Pen VK (Unknown)  predniSONE (Unknown)  sulfa drugs (Unknown)    REVIEW OF SYSTEMS:  See HPI. All other review of systems is negative unless indicated above.     OBJECTIVE  Physical Exam:  Vital Signs:  Height (cm): 154.94 ( @ 14:22)  Weight (kg): 52.6 ( @ 04:10)  BMI (kg/m2): 21.9 ( @ 04:10)  BSA (m2): 1.5 ( @ 04:10)  Vital Signs Last 24 Hrs  T(C): 36.7 (2020 05:33), Max: 37.9 (31 Dec 2019 23:45)  T(F): 98.1 (2020 05:33), Max: 100.2 (31 Dec 2019 23:45)  HR: 90 (2020 05:33) (71 - 101)  BP: 125/53 (2020 05:33) (123/55 - 152/57)  BP(mean): 77 (2020 01:50) (77 - 77)  RR: 15 (2020 05:33) (15 - 21)  SpO2: 99% (2020 05:33) (92% - 100%)  I&O's Summary      Constitutional: NAD, awake and alert, well-developed  Neurological: AAO x 3, no focal deficits  HEENT: PERRLA, EOMI, MMM  Neck: Soft and supple, No LAD, No JVD  Respiratory: Breath sounds are clear bilaterally, No wheezing, rales or rhonchi  Cardiovascular: S1 and S2, regular rate and rhythm; no Murmurs, gallops or rubs  Gastrointestinal: Bowel Sounds present, soft, nontender, nondistended, no guarding, no rebound tenderness  Back: No CVA tenderness   Extremities: No peripheral edema  Vascular: 2+ peripheral pulses  Musculoskeletal: 5/5 strength b/l upper and lower extremities  Skin: No rashes  Breast: Deferred  Rectal: Deferred    MEDICATIONS  (STANDING):  albuterol/ipratropium for Nebulization 3 milliLiter(s) Nebulizer every 6 hours  aspirin enteric coated 81 milliGRAM(s) Oral daily  hydrALAZINE 25 milliGRAM(s) Oral two times a day  levoFLOXacin IVPB 500 milliGRAM(s) IV Intermittent every 24 hours  lisinopril 20 milliGRAM(s) Oral daily  pantoprazole    Tablet 40 milliGRAM(s) Oral before breakfast  sodium chloride 0.9%. 1000 milliLiter(s) (75 mL/Hr) IV Continuous <Continuous>      LABS: All Labs Reviewed:                        9.5    5.20  )-----------( 237      ( 2020 07:46 )             30.0     01    136  |  107  |  6<L>  ----------------------------<  86  3.5   |  24  |  0.63    Ca    9.1      2020 07:46  Phos  2.9       Mg     2.0         TPro  5.8<L>  /  Alb  2.2<L>  /  TBili  0.3  /  DBili  x   /  AST  10<L>  /  ALT  11<L>  /  AlkPhos  96      PT/INR - ( 2020 07:46 )   PT: 14.2 sec;   INR: 1.27 ratio         PTT - ( 2020 07:46 )  PTT:27.4 sec  CARDIAC MARKERS ( 31 Dec 2019 23:05 )  0.299 ng/mL / x     / x     / x     / x      CARDIAC MARKERS ( 31 Dec 2019 19:57 )  0.287 ng/mL / x     / x     / x     / x      CARDIAC MARKERS ( 31 Dec 2019 15:40 )  0.316 ng/mL / x     / x     / x     / x            Urinalysis Basic - ( 31 Dec 2019 18:05 )    Color: Yellow / Appearance: Clear / S.005 / pH: x  Gluc: x / Ketone: Negative  / Bili: Negative / Urobili: Negative mg/dL   Blood: x / Protein: Negative mg/dL / Nitrite: Negative   Leuk Esterase: Trace / RBC: 0-2 /HPF / WBC 0-2   Sq Epi: x / Non Sq Epi: x / Bacteria: x      RADIOLOGY/EKG:    < from: Xray Chest 2 Views PA/Lat (19 @ 15:31) >    IMPRESSION: Posterior infiltrate, possibly inthe left lower lobe.      < end of copied text > HOSPITALIST PROGRESS NOTE:  SUBJECTIVE:  PCP: Dr. Lilly  Chief Complaint: Patient is a 84y old  Female who presents with a chief complaint of cough (31 Dec 2019 20:12)      HPI:  83 y/o F with PMH of HTN, CVA, renal cancer s/p nephrectomy, lung cancer s/p chemo (last chemo was 2019 and last immunotherapy was 19), COPD, p/w cough. Cough started 4 days ago and is productive of yellow sputum. Also c/o sore throat and chills. Had a tmax of 101.7 at home. Has mild SOB intermittently and some palpitations. Denies CP, nausea, vomiting, diaphoresis, dysuria, increased frequency  States she took baby ASA at home, and was given more ASA in ED.    20: Above reviewed. patient very weak, slightly short of breath; Daugther at bedside, requesting Dr Kong    Allergies:  antihistamines (Unknown)  IV Contrast (Unknown)  PC Pen VK (Unknown)  predniSONE (Unknown)  sulfa drugs (Unknown)    REVIEW OF SYSTEMS:  See HPI. All other review of systems is negative unless indicated above.     OBJECTIVE  Physical Exam:  Vital Signs:  Height (cm): 154.94 (12-31 @ 14:22)  Weight (kg): 52.6 ( @ 04:10)  BMI (kg/m2): 21.9 ( @ 04:10)  BSA (m2): 1.5 ( @ 04:10)  Vital Signs Last 24 Hrs  T(C): 36.7 (2020 05:33), Max: 37.9 (31 Dec 2019 23:45)  T(F): 98.1 (2020 05:33), Max: 100.2 (31 Dec 2019 23:45)  HR: 90 (2020 05:33) (71 - 101)  BP: 125/53 (2020 05:33) (123/55 - 152/57)  BP(mean): 77 (2020 01:50) (77 - 77)  RR: 15 (2020 05:33) (15 - 21)  SpO2: 99% (2020 05:33) (92% - 100%)  I&O's Summary      Constitutional: NAD, awake and alert, well-developed  Neurological: AAO x 3, no focal deficits  HEENT: PERRLA, EOMI, MMM  Neck: Soft and supple, No LAD, No JVD  Respiratory: Breath sounds are clear bilaterally, No wheezing, rales or rhonchi  Cardiovascular: S1 and S2, regular rate and rhythm; no Murmurs, gallops or rubs  Gastrointestinal: Bowel Sounds present, soft, nontender, nondistended, no guarding, no rebound tenderness  Back: No CVA tenderness   Extremities: No peripheral edema  Vascular: 2+ peripheral pulses  Musculoskeletal: 5/5 strength b/l upper and lower extremities  Skin: No rashes  Breast: Deferred  Rectal: Deferred    MEDICATIONS  (STANDING):  albuterol/ipratropium for Nebulization 3 milliLiter(s) Nebulizer every 6 hours  aspirin enteric coated 81 milliGRAM(s) Oral daily  hydrALAZINE 25 milliGRAM(s) Oral two times a day  levoFLOXacin IVPB 500 milliGRAM(s) IV Intermittent every 24 hours  lisinopril 20 milliGRAM(s) Oral daily  pantoprazole    Tablet 40 milliGRAM(s) Oral before breakfast  sodium chloride 0.9%. 1000 milliLiter(s) (75 mL/Hr) IV Continuous <Continuous>      LABS: All Labs Reviewed:                        9.5    5.20  )-----------( 237      ( 2020 07:46 )             30.0         136  |  107  |  6<L>  ----------------------------<  86  3.5   |  24  |  0.63    Ca    9.1      2020 07:46  Phos  2.9       Mg     2.0         TPro  5.8<L>  /  Alb  2.2<L>  /  TBili  0.3  /  DBili  x   /  AST  10<L>  /  ALT  11<L>  /  AlkPhos  96      PT/INR - ( 2020 07:46 )   PT: 14.2 sec;   INR: 1.27 ratio         PTT - ( 2020 07:46 )  PTT:27.4 sec  CARDIAC MARKERS ( 31 Dec 2019 23:05 )  0.299 ng/mL / x     / x     / x     / x      CARDIAC MARKERS ( 31 Dec 2019 19:57 )  0.287 ng/mL / x     / x     / x     / x      CARDIAC MARKERS ( 31 Dec 2019 15:40 )  0.316 ng/mL / x     / x     / x     / x            Urinalysis Basic - ( 31 Dec 2019 18:05 )    Color: Yellow / Appearance: Clear / S.005 / pH: x  Gluc: x / Ketone: Negative  / Bili: Negative / Urobili: Negative mg/dL   Blood: x / Protein: Negative mg/dL / Nitrite: Negative   Leuk Esterase: Trace / RBC: 0-2 /HPF / WBC 0-2   Sq Epi: x / Non Sq Epi: x / Bacteria: x      RADIOLOGY/EKG:    < from: Xray Chest 2 Views PA/Lat (19 @ 15:31) >    IMPRESSION: Posterior infiltrate, possibly inthe left lower lobe.      < end of copied text >

## 2020-01-01 NOTE — PROGRESS NOTE ADULT - ASSESSMENT
85 y/o F with PMH of HTN, CVA, renal cancer s/p nephrectomy, lung cancer s/p chemo (last chemo was 5/21/2019 and last immunotherapy was 11/7/19), COPD, p/w cough    *Sepsis (fever of 101.7 at home and RR >30) 2/2 Community acquired PNA  -CXR - LLL infiltrate   -Ceftriaxone / zithromax  -F/u blood cultures  -RVP negative  -IVF   -Duonebs for COPD    *Elevated troponins - R/o ACS   -Tele monitoring   -Denies CP   -Possibly 2/2 demand ischemia   -continue ASA   -Cardio consult  -Echo    *Microcytic anemia  -No signs / symptoms of bleeding  -F/u outpatient for further work up    *Hyponatremia - resolved   -IVF and re-check Na     *HTN / CVA  -C/w home meds and f/u outpatient for further management        *DVT ppx  -Lovenox 85 y/o F with PMH of HTN, CVA, renal cancer s/p nephrectomy, lung cancer s/p chemo (last chemo was 5/21/2019 and last immunotherapy was 11/7/19), COPD, p/w cough    *Sepsis (fever of 101.7 at home and RR >30) 2/2 HCAP  *Hx of Lung cancer S/P Chemo and Immunotherapy  -CXR - LLL infiltrate   -S/P Ceftriaxone / zithromax /Lovenox  -continue cefepime  -F/u blood cultures  -RVP negative  -IVF   -Duonebs for COPD  -Pulm consult  -ID consult  -Hemeonc consult  -FU CT chest unable to get contrast 2ndry to allergic reaction    *Elevated troponins - Possibly 2/2 demand ischemia   -Tele monitoring   -Denies CP   -continue ASA   -Cardio consult appreciated -  outpt ischemic eval when more stable, after infection clear  -Echo    *Microcytic anemia  -No signs / symptoms of bleeding  -F/u outpatient for further work up    *Hyponatremia - resolved   -IVF and re-check Na     *HTN / CVA  -C/w home meds and f/u outpatient for further management    -Cont asa and start low dose statin.      *DVT ppx  -Lovenox

## 2020-01-01 NOTE — CONSULT NOTE ADULT - ASSESSMENT
PROBLEMS:    HCAP  Hx of Lung cancer S/P Chemo and Immunotherapy  Elevated troponins - Possibly 2/2 demand ischemia  copd/emphyema  Microcytic anemia  HTN  H/O CVA    PLAN:    iv cefepime  add po azithromycin for atypical pneumonia  po prednisone  ct chest  aerossols  dvt prophylasix

## 2020-01-02 PROCEDURE — 93010 ELECTROCARDIOGRAM REPORT: CPT

## 2020-01-02 PROCEDURE — 93306 TTE W/DOPPLER COMPLETE: CPT | Mod: 26

## 2020-01-02 RX ORDER — CEFEPIME 1 G/1
1000 INJECTION, POWDER, FOR SOLUTION INTRAMUSCULAR; INTRAVENOUS EVERY 12 HOURS
Refills: 0 | Status: DISCONTINUED | OUTPATIENT
Start: 2020-01-02 | End: 2020-01-02

## 2020-01-02 RX ORDER — CEFEPIME 1 G/1
1000 INJECTION, POWDER, FOR SOLUTION INTRAMUSCULAR; INTRAVENOUS EVERY 12 HOURS
Refills: 0 | Status: DISCONTINUED | OUTPATIENT
Start: 2020-01-02 | End: 2020-01-05

## 2020-01-02 RX ADMIN — Medication 81 MILLIGRAM(S): at 12:39

## 2020-01-02 RX ADMIN — Medication 25 MILLIGRAM(S): at 06:15

## 2020-01-02 RX ADMIN — Medication 0.25 MILLIGRAM(S): at 10:37

## 2020-01-02 RX ADMIN — LISINOPRIL 20 MILLIGRAM(S): 2.5 TABLET ORAL at 06:15

## 2020-01-02 RX ADMIN — CEFEPIME 100 MILLIGRAM(S): 1 INJECTION, POWDER, FOR SOLUTION INTRAMUSCULAR; INTRAVENOUS at 06:15

## 2020-01-02 RX ADMIN — AZITHROMYCIN 500 MILLIGRAM(S): 500 TABLET, FILM COATED ORAL at 12:39

## 2020-01-02 RX ADMIN — Medication 25 MILLIGRAM(S): at 22:02

## 2020-01-02 RX ADMIN — PANTOPRAZOLE SODIUM 40 MILLIGRAM(S): 20 TABLET, DELAYED RELEASE ORAL at 06:15

## 2020-01-02 RX ADMIN — Medication 0.25 MILLIGRAM(S): at 22:02

## 2020-01-02 RX ADMIN — CEFEPIME 1000 MILLIGRAM(S): 1 INJECTION, POWDER, FOR SOLUTION INTRAMUSCULAR; INTRAVENOUS at 17:30

## 2020-01-02 NOTE — PROGRESS NOTE ADULT - SUBJECTIVE AND OBJECTIVE BOX
HOSPITALIST PROGRESS NOTE:  SUBJECTIVE:  PCP: Dr. Lilly  Chief Complaint: Patient is a 84y old  Female who presents with a chief complaint of cough (31 Dec 2019 20:12)      HPI:  83 y/o F with PMH of HTN, CVA, renal cancer s/p nephrectomy, lung cancer s/p chemo (last chemo was 2019 and last immunotherapy was 19), COPD, p/w cough. Cough started 4 days ago and is productive of yellow sputum. Also c/o sore throat and chills. Had a tmax of 101.7 at home. Has mild SOB intermittently and some palpitations. Denies CP, nausea, vomiting, diaphoresis, dysuria, increased frequency  States she took baby ASA at home, and was given more ASA in ED.    20: Above reviewed. patient very weak, slightly short of breath; Daugther at bedside, requesting Dr Kong  :  breathing slighlty better; NO overnight events. seen by cardio and cleared     Allergies:  antihistamines (Unknown)  IV Contrast (Unknown)  PC Pen VK (Unknown)  predniSONE (Unknown)  sulfa drugs (Unknown)    REVIEW OF SYSTEMS:  See HPI. All other review of systems is negative unless indicated above.     OBJECTIVE  Physical Exam:  Vital Signs Last 24 Hrs  T(C): 36.4 (2020 10:58), Max: 36.8 (2020 20:34)  T(F): 97.5 (2020 10:58), Max: 98.3 (2020 20:34)  HR: 107 (2020 10:58) (76 - 122)  BP: 146/73 (2020 10:58) (133/57 - 166/65)  BP(mean): --  RR: 19 (2020 10:58) (17 - 19)  SpO2: 100% (2020 10:58) (96% - 100%)    Constitutional: NAD, awake and alert, well-developed  Neurological: AAO x 3, no focal deficits  HEENT: PERRLA, EOMI, MMM  Neck: Soft and supple, No LAD, No JVD  Respiratory: Breath sounds are clear bilaterally, No wheezing, rales +rhonchi  Cardiovascular: S1 and S2, regular rate and rhythm; no Murmurs, gallops or rubs  Gastrointestinal: Bowel Sounds present, soft, nontender, nondistended, no guarding, no rebound tenderness  Back: No CVA tenderness   Extremities: No peripheral edema  Vascular: 2+ peripheral pulses  Musculoskeletal: 5/5 strength b/l upper and lower extremities  Skin: No rashes  Breast: Deferred  Rectal: Deferred    MEDICATIONS  (STANDING):  albuterol/ipratropium for Nebulization 3 milliLiter(s) Nebulizer every 6 hours  aspirin enteric coated 81 milliGRAM(s) Oral daily  hydrALAZINE 25 milliGRAM(s) Oral two times a day  levoFLOXacin IVPB 500 milliGRAM(s) IV Intermittent every 24 hours  lisinopril 20 milliGRAM(s) Oral daily  pantoprazole    Tablet 40 milliGRAM(s) Oral before breakfast  sodium chloride 0.9%. 1000 milliLiter(s) (75 mL/Hr) IV Continuous <Continuous>      LABS: All Labs Reviewed:                        9.5    5.20  )-----------( 237      ( 2020 07:46 )             30.0         136  |  107  |  6<L>  ----------------------------<  86  3.5   |  24  |  0.63    Ca    9.1      2020 07:46  Phos  2.9       Mg     2.0         TPro  5.8<L>  /  Alb  2.2<L>  /  TBili  0.3  /  DBili  x   /  AST  10<L>  /  ALT  11<L>  /  AlkPhos  96      PT/INR - ( 2020 07:46 )   PT: 14.2 sec;   INR: 1.27 ratio         PTT - ( 2020 07:46 )  PTT:27.4 sec  CARDIAC MARKERS ( 31 Dec 2019 23:05 )  0.299 ng/mL / x     / x     / x     / x      CARDIAC MARKERS ( 31 Dec 2019 19:57 )  0.287 ng/mL / x     / x     / x     / x      CARDIAC MARKERS ( 31 Dec 2019 15:40 )  0.316 ng/mL / x     / x     / x     / x            Urinalysis Basic - ( 31 Dec 2019 18:05 )    Color: Yellow / Appearance: Clear / S.005 / pH: x  Gluc: x / Ketone: Negative  / Bili: Negative / Urobili: Negative mg/dL   Blood: x / Protein: Negative mg/dL / Nitrite: Negative   Leuk Esterase: Trace / RBC: 0-2 /HPF / WBC 0-2   Sq Epi: x / Non Sq Epi: x / Bacteria: x      RADIOLOGY/EKG:    < from: Xray Chest 2 Views PA/Lat (19 @ 15:31) >    IMPRESSION: Posterior infiltrate, possibly inthe left lower lobe.      < end of copied text >    < from: CT Chest No Cont (20 @ 13:53) >    IMPRESSION:   Left lower lobe infiltrate  Emphysema  Focal nodular scarring in the right apex  Suggestion of left thyroid nodule. Recommend correlation with thyroid ultrasound on a nonemergent basis. Thyroid nodule is noted on prior CT studies.  Small bilateral pleural effusions  Small pericardial effusion      < end of copied text >    < from: Transthoracic Echocardiogram (20 @ 08:51) >     Impression     Summary     Mild (1+) mitral regurgitation is present.   Mild mitral annular calcification is present.   Mild aortic sclerosis is present with normal valvular opening.   Mild (1+) tricuspid valve regurgitation is present.   Normal appearing pulmonic valve structure and function.   Normal appearing left atrium.   The left ventricle is normal in size, wall thickness, wall motion and   contractility.   Estimated left ventricular ejection fraction is 65-70 %.   Normal appearing right atrium.   Normal appearing right ventricle structure and function.   A small pericardial effusion is present.   Pleural effusion - is present.     Signature      < end of copied text >

## 2020-01-02 NOTE — PROGRESS NOTE ADULT - ASSESSMENT
A/P: 85 y/o F with PMH of HTN, CVA, renal cancer s/p nephrectomy, lung cancer s/p chemo (last chemo was 5/21/2019 and last immunotherapy was 11/7/19),   COPD, p/w cough.    1. PNA/sepsis. Cont abx. Cx pending. RVP negative.   Encourage fluids. Mgmt as per primary team.     2. +Troponins. Likely demand ischemia in setting of PNA.   No active CP or dynamic EKG changes.   Cont asa. Tele monitoring for 24 hrs.   Avoid Bbl with COPD. Check lipids.   Can check 2Decho to assess LV fxn.   Will have outpt ischemic eval when more stable, after infection cleared.   cont statins / ACEI     3. HTN. Stable, cont current regimen.     4. H/o CVA. Cont asa. Would also start low dose statin.    5. DVT proph.

## 2020-01-02 NOTE — [facility]I QUERY NOTE - NSCDIOTHERTXTBX_GEN_ALL_CORE_HH
Patient admitted with Sepsis  HCAP documented  Hx of lung cancer    Patient on Cefepime IV q12h    Please further clarify the suspected type of HCAP necessitating the use of IV Cefepime  a) Suspected Gram negative jovani pneumonia  b) Likely MRSA pneumonia  c) Probable pseudomonas pneumonia  d) Other, please clarify

## 2020-01-02 NOTE — PROGRESS NOTE ADULT - ASSESSMENT
85 y/o F with PMH of HTN, CVA, renal cancer s/p nephrectomy, lung cancer s/p chemo (last chemo was 5/21/2019 and last immunotherapy was 11/7/19), COPD, p/w cough    *Sepsis (fever of 101.7 at home and RR >30) 2/2 HCAP  *Hx of Lung cancer S/P Chemo and Immunotherapy  -CXR - LLL infiltrate   -CT chest - LLL infiltrate, B/L PLeural effusions, small pericardial effusion  -S/P Ceftriaxone / zithromax /Lovenox  -continue cefepime#2 and azithromycin 500mg daily #2/3  -blood cultures _ NGTD  -RVP negative  -IVF   -Duonebs for COPD  -Pulm consult appreicated   -ID consult appreciated     *Elevated troponins - Possibly 2/2 demand ischemia   -Tele monitoring - no alarms transfer to MS 1/2  -Denies CP   -continue ASA   -Cardio consult appreciated -  outpt ischemic eval when more stable, after infection clear  -Echo  Estimated left ventricular ejection fraction is 65-70 %.    *Microcytic anemia  -No signs / symptoms of bleeding  -F/u outpatient for further work up    *Hyponatremia - resolved   -IVF and re-check Na     *HTN / CVA  -C/w home meds and f/u outpatient for further management    -Cont asa and start low dose statin.      *DVT ppx  -Lovenox

## 2020-01-02 NOTE — PROGRESS NOTE ADULT - SUBJECTIVE AND OBJECTIVE BOX
Patient is a 84y old  Female who presents with a chief complaint of cough (2020 13:36)    1/2- cough improved , afebrile for 24 hours    MEDICATIONS  (STANDING):  aspirin enteric coated 81 milliGRAM(s) Oral daily  atorvastatin 10 milliGRAM(s) Oral at bedtime  azithromycin   Tablet 500 milliGRAM(s) Oral daily  cefepime   IVPB      cefepime   IVPB 500 milliGRAM(s) IV Intermittent every 12 hours  hydrALAZINE 25 milliGRAM(s) Oral two times a day  ipratropium    for Nebulization 500 MICROGram(s) Nebulizer every 6 hours  lisinopril 20 milliGRAM(s) Oral daily  pantoprazole    Tablet 40 milliGRAM(s) Oral before breakfast  sodium chloride 0.9%. 1000 milliLiter(s) (75 mL/Hr) IV Continuous <Continuous>    MEDICATIONS  (PRN):  acetaminophen   Tablet .. 650 milliGRAM(s) Oral every 6 hours PRN Mild Pain (1 - 3)  ALPRAZolam 0.25 milliGRAM(s) Oral three times a day PRN anxiety  aluminum hydroxide/magnesium hydroxide/simethicone Suspension 30 milliLiter(s) Oral every 6 hours PRN Dyspepsia  artificial  tears Solution 1 Drop(s) Both EYES three times a day PRN Dry Eyes            Vital Signs Last 24 Hrs  T(C): 36.7 (2020 04:50), Max: 37.1 (2020 11:08)  T(F): 98.1 (2020 04:50), Max: 98.7 (2020 11:08)  HR: 91 (2020 06:14) (76 - 122)  BP: 140/51 (2020 06:14) (130/54 - 166/65)  BP(mean): --  RR: 18 (2020 04:50) (17 - 18)  SpO2: 96% (2020 04:50) (96% - 99%)            INTERPRETATION OF TELEMETRY:  SR  ECG:        LABS:                        9.5    5.20  )-----------( 237      ( 2020 07:46 )             30.0     01-    136  |  107  |  6<L>  ----------------------------<  86  3.5   |  24  |  0.63    Ca    9.1      2020 07:46  Phos  2.9     -  Mg     2.0         TPro  5.8<L>  /  Alb  2.2<L>  /  TBili  0.3  /  DBili  x   /  AST  10<L>  /  ALT  11<L>  /  AlkPhos  96  -    CARDIAC MARKERS ( 2020 21:16 )  0.103 ng/mL / x     / x     / x     / x      CARDIAC MARKERS ( 2020 17:58 )  0.112 ng/mL / x     / x     / x     / x      CARDIAC MARKERS ( 31 Dec 2019 23:05 )  0.299 ng/mL / x     / x     / x     / x      CARDIAC MARKERS ( 31 Dec 2019 19:57 )  0.287 ng/mL / x     / x     / x     / x      CARDIAC MARKERS ( 31 Dec 2019 15:40 )  0.316 ng/mL / x     / x     / x     / x          PT/INR - ( 2020 07:46 )   PT: 14.2 sec;   INR: 1.27 ratio         PTT - ( 2020 07:46 )  PTT:27.4 sec  Urinalysis Basic - ( 31 Dec 2019 18:05 )    Color: Yellow / Appearance: Clear / S.005 / pH: x  Gluc: x / Ketone: Negative  / Bili: Negative / Urobili: Negative mg/dL   Blood: x / Protein: Negative mg/dL / Nitrite: Negative   Leuk Esterase: Trace / RBC: 0-2 /HPF / WBC 0-2   Sq Epi: x / Non Sq Epi: x / Bacteria: x      I&O's Summary    BNP  RADIOLOGY & ADDITIONAL STUDIES:

## 2020-01-02 NOTE — CONSULT NOTE ADULT - SUBJECTIVE AND OBJECTIVE BOX
Patient is a 84y old  Female who presents with a chief complaint of cough (2020 09:14)    HPI:  83 y/o F with PMH of HTN, CVA, renal cancer s/p nephrectomy, lung cancer s/p chemo (last chemo was 2019 and last immunotherapy was 19), COPD, p/w cough on . Cough started 4 days ago and is productive of yellow sputum. Also c/o sore throat and chills. Had a tmax of 101.7 at home. Has mild SOB intermittently and some palpitations. Denies CP, nausea, vomiting, diaphoresis, dysuria, increased frequency. States she took baby ASA at home, and was given more ASA in ED. Here afebrile, nl wbc ct, CT chest with left lower lobe infiltrate, emphysema, R apex scarring, small b/l pleural effusions/small pericardial effusions was given IV cefepime/azithromycin/po steroids.       PSH: Nephrectomy  PMH: as above    Meds: per reconciliation sheet, noted below  MEDICATIONS  (STANDING):  aspirin enteric coated 81 milliGRAM(s) Oral daily  atorvastatin 10 milliGRAM(s) Oral at bedtime  azithromycin   Tablet 500 milliGRAM(s) Oral daily  cefepime   IVPB 1000 milliGRAM(s) IV Intermittent every 12 hours  hydrALAZINE 25 milliGRAM(s) Oral two times a day  ipratropium    for Nebulization 500 MICROGram(s) Nebulizer every 6 hours  lisinopril 20 milliGRAM(s) Oral daily  pantoprazole    Tablet 40 milliGRAM(s) Oral before breakfast  sodium chloride 0.9%. 1000 milliLiter(s) (75 mL/Hr) IV Continuous     Allergies    antihistamines (Unknown)  IV Contrast (Unknown)  PC Pen VK (Unknown)  sulfa drugs (Unknown)    Intolerances    predniSONE (Unknown)    Social: former smoking, no alcohol, no illegal drugs; no recent travel, no exposure to TB  FAMILY HISTORY:     no history of premature cardiovascular disease in first degree relatives    ROS: the patient denies fever, no chills, no HA, no dizziness, no sore throat, no blurry vision, no CP, no palpitations, no abdominal pain, no diarrhea, no N/V, no dysuria, no leg pain, no claudication, no rash, no joint aches, no rectal pain or bleeding, no night sweats  All other systems reviewed and are negative    Vital Signs Last 24 Hrs  T(C): 36.4 (2020 10:58), Max: 36.8 (2020 20:34)  T(F): 97.5 (2020 10:58), Max: 98.3 (2020 20:34)  HR: 107 (2020 10:58) (76 - 122)  BP: 146/73 (2020 10:58) (133/57 - 166/65)  BP(mean): --  RR: 19 (2020 10:58) (17 - 19)  SpO2: 100% (2020 10:58) (96% - 100%)  Daily         PE:  Constitutional: frail looking  HEENT: NC/AT, EOMI, PERRLA, conjunctivae clear; ears and nose atraumatic; pharynx benign  Neck: supple; thyroid not palpable  Back: no tenderness  Respiratory: decreased breath sounds  Cardiovascular: S1S2 regular, no murmurs  Abdomen: soft, not tender, not distended, positive BS; liver and spleen WNL  Genitourinary: no suprapubic tenderness  Lymphatic: no LN palpable  Musculoskeletal: no muscle tenderness, no joint swelling or tenderness  Extremities: no pedal edema  Neurological/ Psychiatric:   moving all extremities  Skin: no rashes; no palpable lesions    Labs: all available labs reviewed                        9.5    5.20  )-----------( 237      ( 2020 07:46 )             30.0     -    136  |  107  |  6<L>  ----------------------------<  86  3.5   |  24  |  0.63    Ca    9.1      2020 07:46  Phos  2.9     -  Mg     2.0     -    TPro  5.8<L>  /  Alb  2.2<L>  /  TBili  0.3  /  DBili  x   /  AST  10<L>  /  ALT  11<L>  /  AlkPhos  96  -     LIVER FUNCTIONS - ( 2020 07:46 )  Alb: 2.2 g/dL / Pro: 5.8 gm/dL / ALK PHOS: 96 U/L / ALT: 11 U/L / AST: 10 U/L / GGT: x           Urinalysis Basic - ( 31 Dec 2019 18:05 )    Color: Yellow / Appearance: Clear / S.005 / pH: x  Gluc: x / Ketone: Negative  / Bili: Negative / Urobili: Negative mg/dL   Blood: x / Protein: Negative mg/dL / Nitrite: Negative   Leuk Esterase: Trace / RBC: 0-2 /HPF / WBC 0-2   Sq Epi: x / Non Sq Epi: x / Bacteria: x          Radiology: all available radiological tests reviewed    < from: CT Chest No Cont (20 @ 13:53) >  EXAM:  CT CHEST                            PROCEDURE DATE:  2020          INTERPRETATION:  CLINICAL INFORMATION: Shortness of breath , pneumonia    COMPARISON: 2018, outside study    PROCEDURE:   CT of the Chest was performed without intravenous contrast.  Sagittal and coronal reformats were performed.      FINDINGS:    LUNGS AND AIRWAYS: Patent central airways.  Emphysema is again noted. Focal scarring in the posterior segment of the right upper lobe is less prominent than previously. There is infiltrate in the left lower lobe.    PLEURA: Small bilateral pleural effusions.    MEDIASTINUM AND MAN: No lymphadenopathy.    VESSELS: Atherosclerotic calcification of the aorta and coronary artery calcification.    HEART: Heart size isnormal. Small pericardial effusion.    CHEST WALL AND LOWER NECK: Enlargement of the left thyroid lobe with left thyroid nodule suggested    VISUALIZED UPPER ABDOMEN: Probable right nephrectomy. Small right adrenal adenoma is again appreciated.    BONES: Degenerative changes. There is S-shaped scoliosis of the thoracolumbar spine.    IMPRESSION:   Left lower lobe infiltrate  Emphysema  Focal nodular scarring in the right apex  Suggestion of left thyroid nodule. Recommend correlation with thyroid ultrasound on a nonemergent basis. Thyroid nodule is noted on prior CT studies.  Small bilateral pleural effusions  Small pericardial effusion      Advanced directives addressed: full resuscitation

## 2020-01-02 NOTE — CONSULT NOTE ADULT - SUBJECTIVE AND OBJECTIVE BOX
patient known to Dr. Vides  January 2019 diagnosed with a cT1c  pN 1/c N2  stage IIIA non-small cell carcinoma  original scans with of lung/primary right apex, with prominent pretracheal lymph nodes. Suspicious 2.7 x 1.4 cm right adrenal nodule    Initially treated with Taxol and carboplatin concurrent radiation  June 20 began Durvalumab  Is very weak and fatigued, transferred care to [facility] received additional Durvalumab October 10 and November 17  found to have mildly low ACTH    Remains profoundly weak with mild dyspnea anorexia   had  recent endocrinology consultation and apparently was told there is no endocrinopathy related to immunotherapy    Now brought to the hospital and admitted with progressive fatigue ,having difficulty ambulating, fever 101.7, intermittent dyspnea palpitations    past medical history includes  extensive tobacco use stopped August 2018 / COPD  Reason cell carcinoma ,right kidney right nephrectomy 2000  CVA August 2018    84-year-old female alert and oriented appears comfortable sitting up in chair    Complete Blood Count + Automated Diff (01.01.20 @ 07:46)    WBC Count: 5.20 K/uL    RBC Count: 3.49 M/uL    Hemoglobin: 9.5 g/dL    Hematocrit: 30.0 %    Mean Cell Volume: 86.0 fl    Mean Cell Hemoglobin: 27.2 pg    Mean Cell Hemoglobin Conc: 31.7 gm/dL    Red Cell Distrib Width: 14.7 %    Platelet Count - Automated: 237 K/uL    Comprehensive Metabolic Panel (01.01.20 @ 07:46)    Sodium, Serum: 136 mmol/L    Potassium, Serum: 3.5 mmol/L    Chloride, Serum: 107 mmol/L    Carbon Dioxide, Serum: 24 mmol/L    Anion Gap, Serum: 5 mmol/L    Blood Urea Nitrogen, Serum: 6 mg/dL    Creatinine, Serum: 0.63 mg/dL    Glucose, Serum: 86 mg/dL    Calcium, Total Serum: 9.1 mg/dL    Protein Total, Serum: 5.8 gm/dL    Albumin, Serum: 2.2 g/dL    Bilirubin Total, Serum: 0.3 mg/dL    Alkaline Phosphatase, Serum: 96 U/L    Aspartate Aminotransferase (AST/SGOT): 10 U/L    Alanine Aminotransferase (ALT/SGPT): 11 U/L      EXAM:  CT CHEST                            PROCEDURE DATE:  01/01/2020          INTERPRETATION:  CLINICAL INFORMATION: Shortness of breath , pneumonia    COMPARISON: 8/22/2018, outside study    PROCEDURE:   CT of the Chest was performed without intravenous contrast.  Sagittal and coronal reformats were performed.      FINDINGS:    LUNGS AND AIRWAYS: Patent central airways.  Emphysema is again noted. Focal scarring in the posterior segment of the right upper lobe is less prominent than previously. There is infiltrate in the left lower lobe.    PLEURA: Small bilateral pleural effusions.    MEDIASTINUM AND MAN: No lymphadenopathy.    VESSELS: Atherosclerotic calcification of the aorta and coronary artery calcification.    HEART: Heart size isnormal. Small pericardial effusion.    CHEST WALL AND LOWER NECK: Enlargement of the left thyroid lobe with left thyroid nodule suggested    VISUALIZED UPPER ABDOMEN: Probable right nephrectomy. Small right adrenal adenoma is again appreciated.    BONES: Degenerative changes. There is S-shaped scoliosis of the thoracolumbar spine.    IMPRESSION:   Left lower lobe infiltrate  Emphysema  Focal nodular scarring in the right apex  Suggestion of left thyroid nodule. Recommend correlation with thyroid ultrasound on a nonemergent basis. Thyroid nodule is noted on prior CT studies.  Small bilateral pleural effusions  Small pericardial effusion

## 2020-01-02 NOTE — PROGRESS NOTE ADULT - ASSESSMENT
PROBLEMS:    HCAP  Hx of Lung cancer S/P Chemo and Immunotherapy  Elevated troponins - Possibly 2/2 demand ischemia  copd/emphyema  Microcytic anemia  HTN  H/O CVA    PLAN:    pulmonary better  iv cefepime/ po azithromycin for atypical pneumonia  ct chest fu  aerossols  dvt prophylasix

## 2020-01-02 NOTE — CONSULT NOTE ADULT - ASSESSMENT
83 y/o F with PMH of HTN, CVA, renal cancer s/p nephrectomy, lung cancer s/p chemo (last chemo was 5/21/2019 and last immunotherapy was 11/7/19), COPD, p/w cough on 12/31. Cough started 4 days ago and is productive of yellow sputum. Also c/o sore throat and chills. Had a tmax of 101.7 at home. Has mild SOB intermittently and some palpitations. Denies CP, nausea, vomiting, diaphoresis, dysuria, increased frequency. States she took baby ASA at home, and was given more ASA in ED. Here afebrile, nl wbc ct, CT chest with left lower lobe infiltrate, emphysema, R apex scarring, small b/l pleural effusions/small pericardial effusions was given IV cefepime/azithromycin/po steroids.     1. LLL pneumonia. lung cancer s/p chemo-immunotherapy. COPD  - agree with IV cefepime increase dose to [id number]  - agree with azithromycin 500mg daily  - continue with abx coverage  - monitor temps  - on po steroids/inhalers for emphysema   - tolerating abx well so far; no side effects noted  - reason for abx use and side effects reviewed with patient  - supportive care  - fu cbc    2. other issues -care per medicine 83 y/o F with PMH of HTN, CVA, renal cancer s/p nephrectomy, lung cancer s/p chemo (last chemo was 5/21/2019 and last immunotherapy was 11/7/19), COPD, p/w cough on 12/31. Cough started 4 days ago and is productive of yellow sputum. Also c/o sore throat and chills. Had a tmax of 101.7 at home. Has mild SOB intermittently and some palpitations. Denies CP, nausea, vomiting, diaphoresis, dysuria, increased frequency. States she took baby ASA at home, and was given more ASA in ED. Here afebrile, nl wbc ct, CT chest with left lower lobe infiltrate, emphysema, R apex scarring, small b/l pleural effusions/small pericardial effusions was given IV cefepime/azithromycin/po steroids.     1. LLL pneumonia. lung cancer s/p chemo-immunotherapy. COPD  - agree with IV cefepime increase dose to [id number] #2  - agree with azithromycin 500mg daily #2/3  - continue with abx coverage  - monitor temps  - urine cx/blood cx no growth, RVP (-)  - on po steroids/inhalers for emphysema   - tolerating abx well so far; no side effects noted  - reason for abx use and side effects reviewed with patient  - supportive care  - fu cbc    2. other issues -care per medicine

## 2020-01-03 LAB
ANION GAP SERPL CALC-SCNC: 7 MMOL/L — SIGNIFICANT CHANGE UP (ref 5–17)
BUN SERPL-MCNC: 11 MG/DL — SIGNIFICANT CHANGE UP (ref 7–23)
CALCIUM SERPL-MCNC: 9 MG/DL — SIGNIFICANT CHANGE UP (ref 8.5–10.1)
CHLORIDE SERPL-SCNC: 107 MMOL/L — SIGNIFICANT CHANGE UP (ref 96–108)
CO2 SERPL-SCNC: 27 MMOL/L — SIGNIFICANT CHANGE UP (ref 22–31)
CREAT SERPL-MCNC: 0.58 MG/DL — SIGNIFICANT CHANGE UP (ref 0.5–1.3)
FOLATE SERPL-MCNC: 7.5 NG/ML — SIGNIFICANT CHANGE UP
GLUCOSE SERPL-MCNC: 97 MG/DL — SIGNIFICANT CHANGE UP (ref 70–99)
HAPTOGLOB SERPL-MCNC: 297 MG/DL — HIGH (ref 34–200)
HCT VFR BLD CALC: 29.6 % — LOW (ref 34.5–45)
HGB BLD-MCNC: 9.3 G/DL — LOW (ref 11.5–15.5)
IRON SATN MFR SERPL: 11 UG/DL — LOW (ref 30–160)
IRON SATN MFR SERPL: 6 % — LOW (ref 14–50)
MAGNESIUM SERPL-MCNC: 2 MG/DL — SIGNIFICANT CHANGE UP (ref 1.6–2.6)
MCHC RBC-ENTMCNC: 27.4 PG — SIGNIFICANT CHANGE UP (ref 27–34)
MCHC RBC-ENTMCNC: 31.4 GM/DL — LOW (ref 32–36)
MCV RBC AUTO: 87.1 FL — SIGNIFICANT CHANGE UP (ref 80–100)
PHOSPHATE SERPL-MCNC: 2.9 MG/DL — SIGNIFICANT CHANGE UP (ref 2.5–4.5)
PLATELET # BLD AUTO: 277 K/UL — SIGNIFICANT CHANGE UP (ref 150–400)
POTASSIUM SERPL-MCNC: 3.4 MMOL/L — LOW (ref 3.5–5.3)
POTASSIUM SERPL-SCNC: 3.4 MMOL/L — LOW (ref 3.5–5.3)
RBC # BLD: 3.4 M/UL — LOW (ref 3.8–5.2)
RBC # FLD: 14.9 % — HIGH (ref 10.3–14.5)
SODIUM SERPL-SCNC: 141 MMOL/L — SIGNIFICANT CHANGE UP (ref 135–145)
TIBC SERPL-MCNC: 195 UG/DL — LOW (ref 220–430)
UIBC SERPL-MCNC: 184 UG/DL — SIGNIFICANT CHANGE UP (ref 110–370)
VIT B12 SERPL-MCNC: 534 PG/ML — SIGNIFICANT CHANGE UP (ref 232–1245)
WBC # BLD: 4.59 K/UL — SIGNIFICANT CHANGE UP (ref 3.8–10.5)
WBC # FLD AUTO: 4.59 K/UL — SIGNIFICANT CHANGE UP (ref 3.8–10.5)

## 2020-01-03 RX ORDER — POLYETHYLENE GLYCOL 3350 17 G/17G
17 POWDER, FOR SOLUTION ORAL DAILY
Refills: 0 | Status: DISCONTINUED | OUTPATIENT
Start: 2020-01-03 | End: 2020-01-04

## 2020-01-03 RX ORDER — POTASSIUM CHLORIDE 20 MEQ
40 PACKET (EA) ORAL EVERY 4 HOURS
Refills: 0 | Status: COMPLETED | OUTPATIENT
Start: 2020-01-03 | End: 2020-01-03

## 2020-01-03 RX ORDER — SODIUM FERRIC GLUCONAT/SUCROSE 62.5MG/5ML
125 AMPUL (ML) INTRAVENOUS ONCE
Refills: 0 | Status: COMPLETED | OUTPATIENT
Start: 2020-01-03 | End: 2020-01-03

## 2020-01-03 RX ORDER — SENNA PLUS 8.6 MG/1
2 TABLET ORAL AT BEDTIME
Refills: 0 | Status: DISCONTINUED | OUTPATIENT
Start: 2020-01-03 | End: 2020-01-05

## 2020-01-03 RX ORDER — ENOXAPARIN SODIUM 100 MG/ML
40 INJECTION SUBCUTANEOUS DAILY
Refills: 0 | Status: DISCONTINUED | OUTPATIENT
Start: 2020-01-03 | End: 2020-01-05

## 2020-01-03 RX ADMIN — Medication 81 MILLIGRAM(S): at 11:08

## 2020-01-03 RX ADMIN — CEFEPIME 1000 MILLIGRAM(S): 1 INJECTION, POWDER, FOR SOLUTION INTRAMUSCULAR; INTRAVENOUS at 05:05

## 2020-01-03 RX ADMIN — ENOXAPARIN SODIUM 40 MILLIGRAM(S): 100 INJECTION SUBCUTANEOUS at 11:10

## 2020-01-03 RX ADMIN — Medication 0.25 MILLIGRAM(S): at 18:39

## 2020-01-03 RX ADMIN — Medication 25 MILLIGRAM(S): at 05:11

## 2020-01-03 RX ADMIN — LISINOPRIL 20 MILLIGRAM(S): 2.5 TABLET ORAL at 06:35

## 2020-01-03 RX ADMIN — Medication 40 MILLIEQUIVALENT(S): at 11:08

## 2020-01-03 RX ADMIN — Medication 25 MILLIGRAM(S): at 18:39

## 2020-01-03 RX ADMIN — POLYETHYLENE GLYCOL 3350 17 GRAM(S): 17 POWDER, FOR SOLUTION ORAL at 21:01

## 2020-01-03 RX ADMIN — PANTOPRAZOLE SODIUM 40 MILLIGRAM(S): 20 TABLET, DELAYED RELEASE ORAL at 05:12

## 2020-01-03 RX ADMIN — Medication 0.25 MILLIGRAM(S): at 06:37

## 2020-01-03 RX ADMIN — CEFEPIME 1000 MILLIGRAM(S): 1 INJECTION, POWDER, FOR SOLUTION INTRAMUSCULAR; INTRAVENOUS at 18:38

## 2020-01-03 RX ADMIN — Medication 110 MILLIGRAM(S): at 18:37

## 2020-01-03 RX ADMIN — AZITHROMYCIN 500 MILLIGRAM(S): 500 TABLET, FILM COATED ORAL at 11:08

## 2020-01-03 NOTE — PROGRESS NOTE ADULT - SUBJECTIVE AND OBJECTIVE BOX
Date of service: 01-03-20 @ 11:32    pt seen and examined  feels weak, noted with wheezing  dry cough  afebrile    ROS: no fever or chills; denies dizziness, no HA,  no abdominal pain, no diarrhea or constipation; no dysuria, no urinary frequency, no legs pain, no rashes    MEDICATIONS  (STANDING):  aspirin enteric coated 81 milliGRAM(s) Oral daily  atorvastatin 10 milliGRAM(s) Oral at bedtime  azithromycin   Tablet 500 milliGRAM(s) Oral daily  cefepime  Injectable. 1000 milliGRAM(s) IV Push every 12 hours  enoxaparin Injectable 40 milliGRAM(s) SubCutaneous daily  hydrALAZINE 25 milliGRAM(s) Oral two times a day  ipratropium    for Nebulization 500 MICROGram(s) Nebulizer every 6 hours  lisinopril 20 milliGRAM(s) Oral daily  pantoprazole    Tablet 40 milliGRAM(s) Oral before breakfast  potassium chloride    Tablet ER 40 milliEquivalent(s) Oral every 4 hours  sodium chloride 0.9%. 1000 milliLiter(s) (75 mL/Hr) IV Continuous     Vital Signs Last 24 Hrs  T(C): 36.6 (03 Jan 2020 05:01), Max: 37.4 (02 Jan 2020 20:51)  T(F): 97.8 (03 Jan 2020 05:01), Max: 99.3 (02 Jan 2020 20:51)  HR: 98 (03 Jan 2020 05:01) (98 - 107)  BP: 146/64 (03 Jan 2020 05:01) (107/47 - 146/64)  BP(mean): --  RR: 18 (03 Jan 2020 05:01) (18 - 19)  SpO2: 96% (03 Jan 2020 05:01) (96% - 97%)      PE:  Constitutional: frail looking  HEENT: NC/AT, EOMI, PERRLA, conjunctivae clear; ears and nose atraumatic; pharynx benign  Neck: supple; thyroid not palpable  Back: no tenderness  Respiratory: decreased breath sounds  Cardiovascular: S1S2 regular, no murmurs  Abdomen: soft, not tender, not distended, positive BS; liver and spleen WNL  Genitourinary: no suprapubic tenderness  Lymphatic: no LN palpable  Musculoskeletal: no muscle tenderness, no joint swelling or tenderness  Extremities: no pedal edema  Neurological/ Psychiatric:   moving all extremities  Skin: no rashes; no palpable lesions    Labs: all available labs reviewed                        9.3    4.59  )-----------( 277      ( 03 Jan 2020 07:36 )             29.6     01-03    141  |  107  |  11  ----------------------------<  97  3.4<L>   |  27  |  0.58    Ca    9.0      03 Jan 2020 07:36  Phos  2.9     01-03  Mg     2.0     01-03        Culture - Urine (12.31.19 @ 18:05)    Specimen Source: .Urine None    Culture Results:   <10,000 CFU/mL Normal Urogenital Keya    Culture - Blood (12.31.19 @ 15:48)    Specimen Source: .Blood None    Culture Results:   No growth to date.      Radiology: all available radiological tests reviewed    < from: CT Chest No Cont (01.01.20 @ 13:53) >  EXAM:  CT CHEST                            PROCEDURE DATE:  01/01/2020          INTERPRETATION:  CLINICAL INFORMATION: Shortness of breath , pneumonia    COMPARISON: 8/22/2018, outside study    PROCEDURE:   CT of the Chest was performed without intravenous contrast.  Sagittal and coronal reformats were performed.      FINDINGS:    LUNGS AND AIRWAYS: Patent central airways.  Emphysema is again noted. Focal scarring in the posterior segment of the right upper lobe is less prominent than previously. There is infiltrate in the left lower lobe.    PLEURA: Small bilateral pleural effusions.    MEDIASTINUM AND MAN: No lymphadenopathy.    VESSELS: Atherosclerotic calcification of the aorta and coronary artery calcification.    HEART: Heart size isnormal. Small pericardial effusion.    CHEST WALL AND LOWER NECK: Enlargement of the left thyroid lobe with left thyroid nodule suggested    VISUALIZED UPPER ABDOMEN: Probable right nephrectomy. Small right adrenal adenoma is again appreciated.    BONES: Degenerative changes. There is S-shaped scoliosis of the thoracolumbar spine.    IMPRESSION:   Left lower lobe infiltrate  Emphysema  Focal nodular scarring in the right apex  Suggestion of left thyroid nodule. Recommend correlation with thyroid ultrasound on a nonemergent basis. Thyroid nodule is noted on prior CT studies.  Small bilateral pleural effusions  Small pericardial effusion      Advanced directives addressed: full resuscitation

## 2020-01-03 NOTE — PROGRESS NOTE ADULT - ASSESSMENT
83 y/o F with PMH of HTN, CVA, renal cancer s/p nephrectomy, lung cancer s/p chemo (last chemo was 5/21/2019 and last immunotherapy was 11/7/19), COPD, p/w cough    *Sepsis (fever of 101.7 at home and RR >30) 2/2 HCAP  *Hx of Lung cancer S/P Chemo and Immunotherapy  -CXR - LLL infiltrate   -CT chest - LLL infiltrate, B/L PLeural effusions, small pericardial effusion  -S/P Ceftriaxone / zithromax /Lovenox  -continue cefepime#3 and azithromycin 500mg daily #3/3 suspected gram negative, gram positive and other resistant organisms  -blood cultures _ NGTD  -RVP negative  -IVF   -Duonebs for COPD  -Pulm consult appreicated   -ID consult appreciated     *Elevated troponins - Possibly 2/2 demand ischemia   -Tele monitoring - no alarms transfer to MS 1/2  -Denies CP   -continue ASA   -Cardio consult appreciated -  outpt ischemic eval when more stable, after infection clear  -Echo  Estimated left ventricular ejection fraction is 65-70 %.    *Microcytic anemia - YVONNE  -No signs / symptoms of bleeding  -F/u outpatient for further work up  -Low Iron levels  -start Ferrlecit 1/5 then switch to ferrous sulfate on D/C    *Hyponatremia - resolved   -S/P IVF and re-check Na     *HTN / CVA  -C/w home meds and f/u outpatient for further management    -Cont asa and start low dose statin.      *DVT ppx  -Lovenox

## 2020-01-03 NOTE — PROGRESS NOTE ADULT - ASSESSMENT
PROBLEMS:    HCAP  Hx of Lung cancer S/P Chemo and Immunotherapy  Elevated troponins - Possibly 2/2 demand ischemia  copd/emphyema  Microcytic anemia  HTN  H/O CVA    PLAN:    pulmonary better-slow improving  iv cefepime/ po azithromycin for atypical pneumonia  aerossols  dvt prophylasix

## 2020-01-03 NOTE — PROGRESS NOTE ADULT - ASSESSMENT
83 y/o F with PMH of HTN, CVA, renal cancer s/p nephrectomy, lung cancer s/p chemo (last chemo was 5/21/2019 and last immunotherapy was 11/7/19), COPD, p/w cough on 12/31. Cough started 4 days ago and is productive of yellow sputum. Also c/o sore throat and chills. Had a tmax of 101.7 at home. Has mild SOB intermittently and some palpitations. Denies CP, nausea, vomiting, diaphoresis, dysuria, increased frequency. States she took baby ASA at home, and was given more ASA in ED. Here afebrile, nl wbc ct, CT chest with left lower lobe infiltrate, emphysema, R apex scarring, small b/l pleural effusions/small pericardial effusions was given IV cefepime/azithromycin/po steroids.     1. LLL pneumonia. lung cancer s/p chemo-immunotherapy. COPD  - on IV cefepime increase dose to [id number] #3  - on azithromycin 500mg daily #3/3   - continue with abx coverage  - pulm following, to start steroids for bronchospasm  - monitor temps  - urine cx/blood cx no growth, RVP (-)  - on po steroids/inhalers for emphysema   - tolerating abx well so far; no side effects noted  - reason for abx use and side effects reviewed with patient  - supportive care  - fu cbc    2. other issues -care per medicine

## 2020-01-03 NOTE — PROGRESS NOTE ADULT - SUBJECTIVE AND OBJECTIVE BOX
Subjective:    pat better, lying in bed.    Home Medications:  ALPRAZolam 0.5 mg oral tablet: 1 tab(s) orally 3 times a day (31 Dec 2019 20:13)  Aspir 81 oral delayed release tablet: 1 tab(s) orally once a day (31 Dec 2019 20:13)  azelastine 137 mcg/inh (0.1%) nasal spray: 1 spray(s) nasal 2 times a day, As Needed (31 Dec 2019 20:13)  hydrALAZINE 25 mg oral tablet: 1 tab(s) orally 2 times a day (31 Dec 2019 20:13)  lisinopril 20 mg oral tablet: 1 tab(s) orally once a day (31 Dec 2019 20:13)  pantoprazole 40 mg oral delayed release tablet: 1 tab(s) orally once a day (31 Dec 2019 20:13)  Refresh ophthalmic solution: 1 drop(s) to each affected eye 3 times a day, As Needed (31 Dec 2019 20:13)    MEDICATIONS  (STANDING):  aspirin enteric coated 81 milliGRAM(s) Oral daily  atorvastatin 10 milliGRAM(s) Oral at bedtime  azithromycin   Tablet 500 milliGRAM(s) Oral daily  cefepime  Injectable. 1000 milliGRAM(s) IV Push every 12 hours  enoxaparin Injectable 40 milliGRAM(s) SubCutaneous daily  hydrALAZINE 25 milliGRAM(s) Oral two times a day  ipratropium    for Nebulization 500 MICROGram(s) Nebulizer every 6 hours  lisinopril 20 milliGRAM(s) Oral daily  pantoprazole    Tablet 40 milliGRAM(s) Oral before breakfast  potassium chloride    Tablet ER 40 milliEquivalent(s) Oral every 4 hours  sodium chloride 0.9%. 1000 milliLiter(s) (75 mL/Hr) IV Continuous <Continuous>    MEDICATIONS  (PRN):  acetaminophen   Tablet .. 650 milliGRAM(s) Oral every 6 hours PRN Mild Pain (1 - 3)  ALPRAZolam 0.25 milliGRAM(s) Oral three times a day PRN anxiety  aluminum hydroxide/magnesium hydroxide/simethicone Suspension 30 milliLiter(s) Oral every 6 hours PRN Dyspepsia  artificial  tears Solution 1 Drop(s) Both EYES three times a day PRN Dry Eyes      Allergies    antihistamines (Unknown)  IV Contrast (Unknown)  PC Pen VK (Unknown)  sulfa drugs (Unknown)    Intolerances    predniSONE (Unknown)      Vital Signs Last 24 Hrs  T(C): 36.6 (03 Jan 2020 05:01), Max: 37.4 (02 Jan 2020 20:51)  T(F): 97.8 (03 Jan 2020 05:01), Max: 99.3 (02 Jan 2020 20:51)  HR: 98 (03 Jan 2020 05:01) (98 - 107)  BP: 146/64 (03 Jan 2020 05:01) (107/47 - 146/64)  BP(mean): --  RR: 18 (03 Jan 2020 05:01) (18 - 19)  SpO2: 96% (03 Jan 2020 05:01) (96% - 97%)      PHYSICAL EXAMINATION:    NECK:  Supple. No lymphadenopathy. Jugular venous pressure not elevated. Carotids equal.   HEART:   The cardiac impulse has a normal quality. Reg., Nl S1 and S2.  There are no murmurs, rubs or gallops noted  CHEST:  Chest crackles to auscultation. Normal respiratory effort.  ABDOMEN:  Soft and nontender.   EXTREMITIES:  There is no edema.       LABS:                        9.3    4.59  )-----------( 277      ( 03 Jan 2020 07:36 )             29.6     01-03    141  |  107  |  11  ----------------------------<  97  3.4<L>   |  27  |  0.58    Ca    9.0      03 Jan 2020 07:36  Phos  2.9     01-03  Mg     2.0     01-03

## 2020-01-03 NOTE — PROGRESS NOTE ADULT - SUBJECTIVE AND OBJECTIVE BOX
HOSPITALIST PROGRESS NOTE:  SUBJECTIVE:  PCP: Dr. Lilly  Chief Complaint: Patient is a 84y old  Female who presents with a chief complaint of cough (31 Dec 2019 20:12)      HPI:  85 y/o F with PMH of HTN, CVA, renal cancer s/p nephrectomy, lung cancer s/p chemo (last chemo was 2019 and last immunotherapy was 19), COPD, p/w cough. Cough started 4 days ago and is productive of yellow sputum. Also c/o sore throat and chills. Had a tmax of 101.7 at home. Has mild SOB intermittently and some palpitations. Denies CP, nausea, vomiting, diaphoresis, dysuria, increased frequency  States she took baby ASA at home, and was given more ASA in ED.    20: Above reviewed. patient very weak, slightly short of breath; Daugther at bedside, requesting Dr Kong  :  breathing slighlty better; NO overnight events. seen by cardio and cleared   1/3:  Patient has been wheezing this morning. upset that she has to stay over the weekend; family requesting PT eval    Allergies:  antihistamines (Unknown)  IV Contrast (Unknown)  PC Pen VK (Unknown)  predniSONE (Unknown)  sulfa drugs (Unknown)    REVIEW OF SYSTEMS:  See HPI. All other review of systems is negative unless indicated above.     OBJECTIVE  Physical Exam:  Vital Signs Last 24 Hrs  T(C): 36.6 (2020 11:32), Max: 37.4 (2020 20:51)  T(F): 97.9 (2020 11:32), Max: 99.3 (2020 20:51)  HR: 94 (2020 11:32) (94 - 107)  BP: 139/54 (2020 11:32) (107/47 - 146/64)  BP(mean): --  RR: 18 (2020 11:32) (18 - 19)  SpO2: 99% (2020 11:32) (96% - 99%)    Constitutional: NAD, awake and alert, well-developed  Neurological: AAO x 3, no focal deficits  HEENT: PERRLA, EOMI, MMM  Neck: Soft and supple, No LAD, No JVD  Respiratory: Breath sounds are clear bilaterally, No wheezing, rales +rhonchi  Cardiovascular: S1 and S2, regular rate and rhythm; no Murmurs, gallops or rubs  Gastrointestinal: Bowel Sounds present, soft, nontender, nondistended, no guarding, no rebound tenderness  Back: No CVA tenderness   Extremities: No peripheral edema  Vascular: 2+ peripheral pulses  Musculoskeletal: 5/5 strength b/l upper and lower extremities  Skin: No rashes  Breast: Deferred  Rectal: Deferred    MEDICATIONS  (STANDING):  albuterol/ipratropium for Nebulization 3 milliLiter(s) Nebulizer every 6 hours  aspirin enteric coated 81 milliGRAM(s) Oral daily  hydrALAZINE 25 milliGRAM(s) Oral two times a day  levoFLOXacin IVPB 500 milliGRAM(s) IV Intermittent every 24 hours  lisinopril 20 milliGRAM(s) Oral daily  pantoprazole    Tablet 40 milliGRAM(s) Oral before breakfast  sodium chloride 0.9%. 1000 milliLiter(s) (75 mL/Hr) IV Continuous <Continuous>      LABS: All Labs Reviewed:                        9.5    5.20  )-----------( 237      ( 2020 07:46 )             30.0     01    136  |  107  |  6<L>  ----------------------------<  86  3.5   |  24  |  0.63    Ca    9.1      2020 07:46  Phos  2.9       Mg     2.0         TPro  5.8<L>  /  Alb  2.2<L>  /  TBili  0.3  /  DBili  x   /  AST  10<L>  /  ALT  11<L>  /  AlkPhos  96      PT/INR - ( 2020 07:46 )   PT: 14.2 sec;   INR: 1.27 ratio         PTT - ( 2020 07:46 )  PTT:27.4 sec  CARDIAC MARKERS ( 31 Dec 2019 23:05 )  0.299 ng/mL / x     / x     / x     / x      CARDIAC MARKERS ( 31 Dec 2019 19:57 )  0.287 ng/mL / x     / x     / x     / x      CARDIAC MARKERS ( 31 Dec 2019 15:40 )  0.316 ng/mL / x     / x     / x     / x            Urinalysis Basic - ( 31 Dec 2019 18:05 )    Color: Yellow / Appearance: Clear / S.005 / pH: x  Gluc: x / Ketone: Negative  / Bili: Negative / Urobili: Negative mg/dL   Blood: x / Protein: Negative mg/dL / Nitrite: Negative   Leuk Esterase: Trace / RBC: 0-2 /HPF / WBC 0-2   Sq Epi: x / Non Sq Epi: x / Bacteria: x      RADIOLOGY/EKG:    < from: Xray Chest 2 Views PA/Lat (19 @ 15:31) >    IMPRESSION: Posterior infiltrate, possibly inthe left lower lobe.      < end of copied text >    < from: CT Chest No Cont (20 @ 13:53) >    IMPRESSION:   Left lower lobe infiltrate  Emphysema  Focal nodular scarring in the right apex  Suggestion of left thyroid nodule. Recommend correlation with thyroid ultrasound on a nonemergent basis. Thyroid nodule is noted on prior CT studies.  Small bilateral pleural effusions  Small pericardial effusion      < end of copied text >    < from: Transthoracic Echocardiogram (20 @ 08:51) >     Impression     Summary     Mild (1+) mitral regurgitation is present.   Mild mitral annular calcification is present.   Mild aortic sclerosis is present with normal valvular opening.   Mild (1+) tricuspid valve regurgitation is present.   Normal appearing pulmonic valve structure and function.   Normal appearing left atrium.   The left ventricle is normal in size, wall thickness, wall motion and   contractility.   Estimated left ventricular ejection fraction is 65-70 %.   Normal appearing right atrium.   Normal appearing right ventricle structure and function.   A small pericardial effusion is present.   Pleural effusion - is present.     Signature      < end of copied text >

## 2020-01-03 NOTE — PHYSICAL THERAPY INITIAL EVALUATION ADULT - CRITERIA FOR SKILLED THERAPEUTIC INTERVENTIONS
rehab potential/therapy frequency/impairments found/predicted duration of therapy intervention/anticipated equipment needs at discharge/risk reduction/prevention/functional limitations in following categories/anticipated discharge recommendation

## 2020-01-03 NOTE — PHYSICAL THERAPY INITIAL EVALUATION ADULT - PERTINENT HX OF CURRENT PROBLEM, REHAB EVAL
Pt presents with a chief complaint of cough, Cough started 4 days ago and is productive of yellow sputum. Also c/o sore throat and chills. Had a tmax of 101.7 at home. Has mild SOB intermittently and some palpitations., Hx of Lung cancer S/P Chemo and Immunotherapy

## 2020-01-04 LAB — EPO SERPL-MCNC: 29.5 MIU/ML — HIGH (ref 2.6–18.5)

## 2020-01-04 RX ORDER — POLYETHYLENE GLYCOL 3350 17 G/17G
17 POWDER, FOR SOLUTION ORAL EVERY 12 HOURS
Refills: 0 | Status: DISCONTINUED | OUTPATIENT
Start: 2020-01-04 | End: 2020-01-05

## 2020-01-04 RX ORDER — SODIUM FERRIC GLUCONAT/SUCROSE 62.5MG/5ML
125 AMPUL (ML) INTRAVENOUS ONCE
Refills: 0 | Status: COMPLETED | OUTPATIENT
Start: 2020-01-04 | End: 2020-01-04

## 2020-01-04 RX ADMIN — Medication 0.25 MILLIGRAM(S): at 00:52

## 2020-01-04 RX ADMIN — ENOXAPARIN SODIUM 40 MILLIGRAM(S): 100 INJECTION SUBCUTANEOUS at 12:19

## 2020-01-04 RX ADMIN — Medication 135 MILLIGRAM(S): at 16:43

## 2020-01-04 RX ADMIN — Medication 500 MICROGRAM(S): at 09:50

## 2020-01-04 RX ADMIN — Medication 81 MILLIGRAM(S): at 12:19

## 2020-01-04 RX ADMIN — Medication 0.25 MILLIGRAM(S): at 10:08

## 2020-01-04 RX ADMIN — PANTOPRAZOLE SODIUM 40 MILLIGRAM(S): 20 TABLET, DELAYED RELEASE ORAL at 06:55

## 2020-01-04 RX ADMIN — CEFEPIME 1000 MILLIGRAM(S): 1 INJECTION, POWDER, FOR SOLUTION INTRAMUSCULAR; INTRAVENOUS at 06:55

## 2020-01-04 RX ADMIN — LISINOPRIL 20 MILLIGRAM(S): 2.5 TABLET ORAL at 06:55

## 2020-01-04 RX ADMIN — Medication 500 MICROGRAM(S): at 20:05

## 2020-01-04 RX ADMIN — Medication 650 MILLIGRAM(S): at 08:12

## 2020-01-04 RX ADMIN — POLYETHYLENE GLYCOL 3350 17 GRAM(S): 17 POWDER, FOR SOLUTION ORAL at 12:19

## 2020-01-04 RX ADMIN — Medication 25 MILLIGRAM(S): at 21:18

## 2020-01-04 RX ADMIN — CEFEPIME 1000 MILLIGRAM(S): 1 INJECTION, POWDER, FOR SOLUTION INTRAMUSCULAR; INTRAVENOUS at 19:36

## 2020-01-04 RX ADMIN — Medication 25 MILLIGRAM(S): at 06:55

## 2020-01-04 NOTE — PROGRESS NOTE ADULT - ASSESSMENT
PROBLEMS:    HCAP  Hx of Lung cancer S/P Chemo and Immunotherapy  Elevated troponins - Possibly 2/2 demand ischemia  copd/emphyema  Microcytic anemia  HTN  H/O CVA    PLAN:    pulmonary better-slow improving-decd planning  iv cefepime  aerossols  dvt prophylasix

## 2020-01-04 NOTE — PROGRESS NOTE ADULT - SUBJECTIVE AND OBJECTIVE BOX
HOSPITALIST PROGRESS NOTE:  SUBJECTIVE:  PCP: Dr. Lilly  Chief Complaint: Patient is a 84y old  Female who presents with a chief complaint of cough (31 Dec 2019 20:12)      HPI:  85 y/o F with PMH of HTN, CVA, renal cancer s/p nephrectomy, lung cancer s/p chemo (last chemo was 2019 and last immunotherapy was 19), COPD, p/w cough. Cough started 4 days ago and is productive of yellow sputum. Also c/o sore throat and chills. Had a tmax of 101.7 at home. Has mild SOB intermittently and some palpitations. Denies CP, nausea, vomiting, diaphoresis, dysuria, increased frequency  States she took baby ASA at home, and was given more ASA in ED.    20: Above reviewed. patient very weak, slightly short of breath; Daugther at bedside, requesting Dr Kong  :  breathing slighlty better; NO overnight events. seen by cardio and cleared   1/3:  Patient has been wheezing this morning. upset that she has to stay over the weekend; family requesting PT maycol  :  zaidt still constipated has not had a bowel movement today; refusing lipitor, enema; family at bedside who states that her xanax shud be 0.5 TID    Allergies:  antihistamines (Unknown)  IV Contrast (Unknown)  PC Pen VK (Unknown)  predniSONE (Unknown)  sulfa drugs (Unknown)    REVIEW OF SYSTEMS:  See HPI. All other review of systems is negative unless indicated above.     OBJECTIVE  Physical Exam:  Vital Signs Last 24 Hrs  T(C): 36.4 (2020 12:09), Max: 36.9 (2020 19:44)  T(F): 97.5 (2020 12:09), Max: 98.5 (2020 19:44)  HR: 93 (2020 12:09) (93 - 100)  BP: 107/47 (2020 12:09) (107/47 - 169/65)  BP(mean): 72 (2020 21:28) (72 - 72)  RR: 18 (2020 12:09) (17 - 18)  SpO2: 97% (2020 12:09) (92% - 97%)    Constitutional: NAD, awake and alert, well-developed  Neurological: AAO x 3, no focal deficits  HEENT: PERRLA, EOMI, MMM  Neck: Soft and supple, No LAD, No JVD  Respiratory: Breath sounds are clear bilaterally, No wheezing, rales +rhonchi  Cardiovascular: S1 and S2, regular rate and rhythm; no Murmurs, gallops or rubs  Gastrointestinal: Bowel Sounds present, soft, nontender, nondistended, no guarding, no rebound tenderness  Back: No CVA tenderness   Extremities: No peripheral edema  Vascular: 2+ peripheral pulses  Musculoskeletal: 5/5 strength b/l upper and lower extremities  Skin: No rashes  Breast: Deferred  Rectal: Deferred    MEDICATIONS  (STANDING):  albuterol/ipratropium for Nebulization 3 milliLiter(s) Nebulizer every 6 hours  aspirin enteric coated 81 milliGRAM(s) Oral daily  hydrALAZINE 25 milliGRAM(s) Oral two times a day  levoFLOXacin IVPB 500 milliGRAM(s) IV Intermittent every 24 hours  lisinopril 20 milliGRAM(s) Oral daily  pantoprazole    Tablet 40 milliGRAM(s) Oral before breakfast  sodium chloride 0.9%. 1000 milliLiter(s) (75 mL/Hr) IV Continuous <Continuous>    Lab Results:  CBC  CBC Full  -  ( 2020 07:36 )  WBC Count : 4.59 K/uL  RBC Count : 3.40 M/uL  Hemoglobin : 9.3 g/dL  Hematocrit : 29.6 %  Platelet Count - Automated : 277 K/uL  Mean Cell Volume : 87.1 fl  Mean Cell Hemoglobin : 27.4 pg  Mean Cell Hemoglobin Concentration : 31.4 gm/dL  Auto Neutrophil # : x  Auto Lymphocyte # : x  Auto Monocyte # : x  Auto Eosinophil # : x  Auto Basophil # : x  Auto Neutrophil % : x  Auto Lymphocyte % : x  Auto Monocyte % : x  Auto Eosinophil % : x  Auto Basophil % : x    .		Differential:	[] Automated		[] Manual  Chemistry                        9.3    4.59  )-----------( 277      ( 2020 07:36 )             29.6     01-    141  |  107  |  11  ----------------------------<  97  3.4<L>   |  27  |  0.58    Ca    9.0      2020 07:36  Phos  2.9     -  Mg     2.0           MICROBIOLOGY/CULTURES:  Culture Results:   <10,000 CFU/mL Normal Urogenital Keya ( @ 18:05)  Culture Results:   No growth to date. ( @ 15:48)  Culture Results:   No growth to date. ( @ 15:40)        CARDIAC MARKERS ( 31 Dec 2019 23:05 )  0.299 ng/mL / x     / x     / x     / x      CARDIAC MARKERS ( 31 Dec 2019 19:57 )  0.287 ng/mL / x     / x     / x     / x      CARDIAC MARKERS ( 31 Dec 2019 15:40 )  0.316 ng/mL / x     / x     / x     / x            Urinalysis Basic - ( 31 Dec 2019 18:05 )    Color: Yellow / Appearance: Clear / S.005 / pH: x  Gluc: x / Ketone: Negative  / Bili: Negative / Urobili: Negative mg/dL   Blood: x / Protein: Negative mg/dL / Nitrite: Negative   Leuk Esterase: Trace / RBC: 0-2 /HPF / WBC 0-2   Sq Epi: x / Non Sq Epi: x / Bacteria: x      RADIOLOGY/EKG:    < from: Xray Chest 2 Views PA/Lat (19 @ 15:31) >    IMPRESSION: Posterior infiltrate, possibly inthe left lower lobe.      < end of copied text >    < from: CT Chest No Cont (20 @ 13:53) >    IMPRESSION:   Left lower lobe infiltrate  Emphysema  Focal nodular scarring in the right apex  Suggestion of left thyroid nodule. Recommend correlation with thyroid ultrasound on a nonemergent basis. Thyroid nodule is noted on prior CT studies.  Small bilateral pleural effusions  Small pericardial effusion      < end of copied text >    < from: Transthoracic Echocardiogram (20 @ 08:51) >     Impression     Summary     Mild (1+) mitral regurgitation is present.   Mild mitral annular calcification is present.   Mild aortic sclerosis is present with normal valvular opening.   Mild (1+) tricuspid valve regurgitation is present.   Normal appearing pulmonic valve structure and function.   Normal appearing left atrium.   The left ventricle is normal in size, wall thickness, wall motion and   contractility.   Estimated left ventricular ejection fraction is 65-70 %.   Normal appearing right atrium.   Normal appearing right ventricle structure and function.   A small pericardial effusion is present.   Pleural effusion - is present.     Signature      < end of copied text >

## 2020-01-04 NOTE — PROGRESS NOTE ADULT - ASSESSMENT
85 y/o F with PMH of HTN, CVA, renal cancer s/p nephrectomy, lung cancer s/p chemo (last chemo was 5/21/2019 and last immunotherapy was 11/7/19), COPD, p/w cough    *Sepsis (fever of 101.7 at home and RR >30) 2/2 HCAP  *Hx of Lung cancer S/P Chemo and Immunotherapy  -CXR - LLL infiltrate   -CT chest - LLL infiltrate, B/L PLeural effusions, small pericardial effusion  -S/P Ceftriaxone / zithromax /Lovenox  -continue cefepime#4 and S/P azithromycin 500mg daily #3/3  -blood cultures - NGTD  -RVP negative  -atrovent Q6H, no albuterol as she get CP/palpitations when taking it  -Pulm consult appreciated   -ID consult appreciated     *Elevated troponins - Possibly 2/2 demand ischemia   -Tele monitoring - no alarms transfer to MS 1/2  -Denies CP   -continue ASA   -Cardio consult appreciated -  outpt ischemic eval when more stable, after infection clear  -Echo  Estimated left ventricular ejection fraction is 65-70 %.    *Microcytic anemia - YVONNE  -No signs / symptoms of bleeding  -F/u outpatient for further work up  -Low Iron levels  -start Ferrlecit 2/5 then switch to ferrous sulfate on D/C    *Hyponatremia - resolved   -S/P IVF and re-check Na     *HTN / CVA  -C/w home meds and f/u outpatient for further management    -Cont asa   -patient low dose statin.      *Constipation  -increase miralax BID, senna, and lactulose; refusing enema    *DVT ppx  -Lovenox

## 2020-01-04 NOTE — PROGRESS NOTE ADULT - SUBJECTIVE AND OBJECTIVE BOX
Subjective:    pat lying in bed, no new complaint.    Home Medications:  ALPRAZolam 0.5 mg oral tablet: 1 tab(s) orally 3 times a day (31 Dec 2019 20:13)  Aspir 81 oral delayed release tablet: 1 tab(s) orally once a day (31 Dec 2019 20:13)  azelastine 137 mcg/inh (0.1%) nasal spray: 1 spray(s) nasal 2 times a day, As Needed (31 Dec 2019 20:13)  hydrALAZINE 25 mg oral tablet: 1 tab(s) orally 2 times a day (31 Dec 2019 20:13)  lisinopril 20 mg oral tablet: 1 tab(s) orally once a day (31 Dec 2019 20:13)  pantoprazole 40 mg oral delayed release tablet: 1 tab(s) orally once a day (31 Dec 2019 20:13)  Refresh ophthalmic solution: 1 drop(s) to each affected eye 3 times a day, As Needed (31 Dec 2019 20:13)    MEDICATIONS  (STANDING):  aspirin enteric coated 81 milliGRAM(s) Oral daily  atorvastatin 10 milliGRAM(s) Oral at bedtime  cefepime  Injectable. 1000 milliGRAM(s) IV Push every 12 hours  enoxaparin Injectable 40 milliGRAM(s) SubCutaneous daily  hydrALAZINE 25 milliGRAM(s) Oral two times a day  ipratropium    for Nebulization 500 MICROGram(s) Nebulizer every 6 hours  lisinopril 20 milliGRAM(s) Oral daily  pantoprazole    Tablet 40 milliGRAM(s) Oral before breakfast  polyethylene glycol 3350 17 Gram(s) Oral daily    MEDICATIONS  (PRN):  acetaminophen   Tablet .. 650 milliGRAM(s) Oral every 6 hours PRN Mild Pain (1 - 3)  ALPRAZolam 0.25 milliGRAM(s) Oral three times a day PRN anxiety  aluminum hydroxide/magnesium hydroxide/simethicone Suspension 30 milliLiter(s) Oral every 6 hours PRN Dyspepsia  artificial  tears Solution 1 Drop(s) Both EYES three times a day PRN Dry Eyes  senna 2 Tablet(s) Oral at bedtime PRN Constipation      Allergies    antihistamines (Unknown)  IV Contrast (Unknown)  PC Pen VK (Unknown)  sulfa drugs (Unknown)    Intolerances    predniSONE (Unknown)      Vital Signs Last 24 Hrs  T(C): 36.7 (04 Jan 2020 06:02), Max: 36.9 (03 Jan 2020 19:44)  T(F): 98 (04 Jan 2020 06:02), Max: 98.5 (03 Jan 2020 19:44)  HR: 97 (04 Jan 2020 06:02) (93 - 100)  BP: 169/65 (04 Jan 2020 06:02) (123/54 - 169/65)  BP(mean): 72 (03 Jan 2020 21:28) (72 - 72)  RR: 17 (03 Jan 2020 19:44) (17 - 18)  SpO2: 92% (04 Jan 2020 06:02) (92% - 99%)      PHYSICAL EXAMINATION:    NECK:  Supple. No lymphadenopathy. Jugular venous pressure not elevated. Carotids equal.   HEART:   The cardiac impulse has a normal quality. Reg., Nl S1 and S2.  There are no murmurs, rubs or gallops noted  CHEST:  Chest is clear to auscultation. Normal respiratory effort.  ABDOMEN:  Soft and nontender.   EXTREMITIES:  There is no edema.       LABS:                        9.3    4.59  )-----------( 277      ( 03 Jan 2020 07:36 )             29.6     01-03    141  |  107  |  11  ----------------------------<  97  3.4<L>   |  27  |  0.58    Ca    9.0      03 Jan 2020 07:36  Phos  2.9     01-03  Mg     2.0     01-03

## 2020-01-05 ENCOUNTER — TRANSCRIPTION ENCOUNTER (OUTPATIENT)
Age: 85
End: 2020-01-05

## 2020-01-05 VITALS — OXYGEN SATURATION: 92 %

## 2020-01-05 LAB
CULTURE RESULTS: SIGNIFICANT CHANGE UP
CULTURE RESULTS: SIGNIFICANT CHANGE UP
SPECIMEN SOURCE: SIGNIFICANT CHANGE UP
SPECIMEN SOURCE: SIGNIFICANT CHANGE UP

## 2020-01-05 RX ORDER — FERROUS SULFATE 325(65) MG
1 TABLET ORAL
Qty: 60 | Refills: 0
Start: 2020-01-05 | End: 2020-02-03

## 2020-01-05 RX ORDER — POLYETHYLENE GLYCOL 3350 17 G/17G
17 POWDER, FOR SOLUTION ORAL
Qty: 510 | Refills: 0
Start: 2020-01-05 | End: 2020-02-03

## 2020-01-05 RX ORDER — CEFUROXIME AXETIL 250 MG
1 TABLET ORAL
Qty: 10 | Refills: 0
Start: 2020-01-05 | End: 2020-01-09

## 2020-01-05 RX ORDER — LACTOBACILLUS ACIDOPHILUS 100MM CELL
1 CAPSULE ORAL
Qty: 10 | Refills: 0
Start: 2020-01-05 | End: 2020-01-09

## 2020-01-05 RX ORDER — ALPRAZOLAM 0.25 MG
0.5 TABLET ORAL ONCE
Refills: 0 | Status: DISCONTINUED | OUTPATIENT
Start: 2020-01-05 | End: 2020-01-05

## 2020-01-05 RX ADMIN — CEFEPIME 1000 MILLIGRAM(S): 1 INJECTION, POWDER, FOR SOLUTION INTRAMUSCULAR; INTRAVENOUS at 05:22

## 2020-01-05 RX ADMIN — PANTOPRAZOLE SODIUM 40 MILLIGRAM(S): 20 TABLET, DELAYED RELEASE ORAL at 05:22

## 2020-01-05 RX ADMIN — LISINOPRIL 20 MILLIGRAM(S): 2.5 TABLET ORAL at 05:22

## 2020-01-05 RX ADMIN — Medication 500 MICROGRAM(S): at 02:22

## 2020-01-05 RX ADMIN — Medication 0.5 MILLIGRAM(S): at 01:24

## 2020-01-05 RX ADMIN — ENOXAPARIN SODIUM 40 MILLIGRAM(S): 100 INJECTION SUBCUTANEOUS at 11:02

## 2020-01-05 RX ADMIN — Medication 81 MILLIGRAM(S): at 11:02

## 2020-01-05 RX ADMIN — Medication 25 MILLIGRAM(S): at 05:22

## 2020-01-05 RX ADMIN — POLYETHYLENE GLYCOL 3350 17 GRAM(S): 17 POWDER, FOR SOLUTION ORAL at 05:22

## 2020-01-05 NOTE — PROGRESS NOTE ADULT - SUBJECTIVE AND OBJECTIVE BOX
Subjective:    pat better, walking around in hallway.    Home Medications:  ALPRAZolam 0.5 mg oral tablet: 1 tab(s) orally 3 times a day (31 Dec 2019 20:13)  Aspir 81 oral delayed release tablet: 1 tab(s) orally once a day (31 Dec 2019 20:13)  azelastine 137 mcg/inh (0.1%) nasal spray: 1 spray(s) nasal 2 times a day, As Needed (31 Dec 2019 20:13)  hydrALAZINE 25 mg oral tablet: 1 tab(s) orally 2 times a day (31 Dec 2019 20:13)  lisinopril 20 mg oral tablet: 1 tab(s) orally once a day (31 Dec 2019 20:13)  pantoprazole 40 mg oral delayed release tablet: 1 tab(s) orally once a day (31 Dec 2019 20:13)  Refresh ophthalmic solution: 1 drop(s) to each affected eye 3 times a day, As Needed (31 Dec 2019 20:13)    MEDICATIONS  (STANDING):  aspirin enteric coated 81 milliGRAM(s) Oral daily  cefepime  Injectable. 1000 milliGRAM(s) IV Push every 12 hours  enoxaparin Injectable 40 milliGRAM(s) SubCutaneous daily  hydrALAZINE 25 milliGRAM(s) Oral two times a day  ipratropium    for Nebulization 500 MICROGram(s) Nebulizer every 6 hours  lisinopril 20 milliGRAM(s) Oral daily  pantoprazole    Tablet 40 milliGRAM(s) Oral before breakfast  polyethylene glycol 3350 17 Gram(s) Oral every 12 hours    MEDICATIONS  (PRN):  acetaminophen   Tablet .. 650 milliGRAM(s) Oral every 6 hours PRN Mild Pain (1 - 3)  aluminum hydroxide/magnesium hydroxide/simethicone Suspension 30 milliLiter(s) Oral every 6 hours PRN Dyspepsia  artificial  tears Solution 1 Drop(s) Both EYES three times a day PRN Dry Eyes  senna 2 Tablet(s) Oral at bedtime PRN Constipation      Allergies    antihistamines (Unknown)  IV Contrast (Unknown)  PC Pen VK (Unknown)  sulfa drugs (Unknown)    Intolerances    predniSONE (Unknown)      Vital Signs Last 24 Hrs  T(C): 37 (05 Jan 2020 04:58), Max: 37.1 (04 Jan 2020 17:16)  T(F): 98.6 (05 Jan 2020 04:58), Max: 98.7 (04 Jan 2020 17:16)  HR: 91 (05 Jan 2020 04:58) (81 - 93)  BP: 140/60 (05 Jan 2020 04:58) (107/47 - 141/66)  BP(mean): --  RR: 18 (05 Jan 2020 04:58) (18 - 18)  SpO2: 95% (05 Jan 2020 04:58) (95% - 98%)      PHYSICAL EXAMINATION:    NECK:  Supple. No lymphadenopathy. Jugular venous pressure not elevated. Carotids equal.   HEART:   The cardiac impulse has a normal quality. Reg., Nl S1 and S2.  There are no murmurs, rubs or gallops noted  CHEST:  Chest crackles to auscultation. Normal respiratory effort.  ABDOMEN:  Soft and nontender.   EXTREMITIES:  There is no edema.       LABS:

## 2020-01-05 NOTE — DISCHARGE NOTE PROVIDER - NSDCMRMEDTOKEN_GEN_ALL_CORE_FT
ALPRAZolam 0.5 mg oral tablet: 1 tab(s) orally 3 times a day  Aspir 81 oral delayed release tablet: 1 tab(s) orally once a day  azelastine 137 mcg/inh (0.1%) nasal spray: 1 spray(s) nasal 2 times a day, As Needed  cefuroxime 500 mg oral tablet: 1 tab(s) orally every 12 hours   ferrous sulfate 325 mg (65 mg elemental iron) oral tablet: 1 tab(s) orally 2 times a day   hydrALAZINE 25 mg oral tablet: 1 tab(s) orally 2 times a day  lactobacillus acidophilus oral capsule: 1 cap(s) orally 2 times a day   lisinopril 20 mg oral tablet: 1 tab(s) orally once a day  MiraLax oral powder for reconstitution: 17 gram(s) orally once a day   pantoprazole 40 mg oral delayed release tablet: 1 tab(s) orally once a day  Refresh ophthalmic solution: 1 drop(s) to each affected eye 3 times a day, As Needed
no diarrhea/no nausea/no vomiting

## 2020-01-05 NOTE — PROGRESS NOTE ADULT - ASSESSMENT
83 y/o F with PMH of HTN, CVA, renal cancer s/p nephrectomy, lung cancer s/p chemo (last chemo was 5/21/2019 and last immunotherapy was 11/7/19), COPD, p/w cough    *Sepsis (fever of 101.7 at home and RR >30) 2/2 HCAP  *Hx of Lung cancer S/P Chemo and Immunotherapy  -CXR - LLL infiltrate   -CT chest - LLL infiltrate, B/L PLeural effusions, small pericardial effusion  -S/P Ceftriaxone / zithromax /Lovenox  -continue cefepime#5 and S/P azithromycin 500mg daily #3/3  -blood cultures - NGTD  -RVP negative  -atrovent Q6H, no albuterol as she get CP/palpitations when taking it  -Pulm consult appreciated   -ID consult appreciated     *Elevated troponins - Possibly 2/2 demand ischemia   -Tele monitoring - no alarms transfer to MS 1/2  -Denies CP   -continue ASA   -Cardio consult appreciated -  outpt ischemic eval when more stable, after infection clear  -Echo  Estimated left ventricular ejection fraction is 65-70 %.    *Microcytic anemia - YVONNE  -No signs / symptoms of bleeding  -F/u outpatient for further work up  -Low Iron levels  -start Ferrlecit 2/5 then switch to ferrous sulfate on D/C    *Hyponatremia - resolved   -S/P IVF and re-check Na     *HTN / CVA  -C/w home meds and f/u outpatient for further management    -Cont asa   -patient low dose statin.      *Constipation  -increase miralax BID, senna, and lactulose; refusing enema    *DVT ppx  -Lovenox

## 2020-01-05 NOTE — DISCHARGE NOTE PROVIDER - NSDCCPCAREPLAN_GEN_ALL_CORE_FT
PRINCIPAL DISCHARGE DIAGNOSIS  Diagnosis: CAP (community acquired pneumonia)  Assessment and Plan of Treatment: *Sepsis (fever of 101.7 at home and RR >30) 2/2 HCAP  *Hx of Lung cancer S/P Chemo and Immunotherapy  -CXR - LLL infiltrate   -CT chest - LLL infiltrate, B/L PLeural effusions, small pericardial effusion  -blood cultures - NGTD  -RVP negative  -Ceftin with probiotic X 5 more days        SECONDARY DISCHARGE DIAGNOSES  Diagnosis: Anemia  Assessment and Plan of Treatment: *Microcytic anemia - YVONNE  -No signs / symptoms of bleeding  -F/u outpatient for further work up  including colonoscopy  -Low Iron levels  -S/P Ferrlecit #2 switch to ferrous sulfate BID    Diagnosis: Troponin I above reference range  Assessment and Plan of Treatment: outpaitent ischmic wourkup with stress test   -continue ASA   -FU with Cardio consult appreciated   -Echo  Estimated left ventricular ejection fraction is 65-70 %.

## 2020-01-05 NOTE — PROGRESS NOTE ADULT - ASSESSMENT
PROBLEMS:    HCAP  Hx of Lung cancer S/P Chemo and Immunotherapy  Elevated troponins - Possibly 2/2 demand ischemia  copd/emphyema  Microcytic anemia  HTN  H/O CVA    PLAN:    pulmonary better-decd today  iv cefepime  aerossols  dvt prophylasix

## 2020-01-05 NOTE — DISCHARGE NOTE NURSING/CASE MANAGEMENT/SOCIAL WORK - NSDCPEEMAIL_GEN_ALL_CORE
[facility] for Tobacco Control email [email or web address]

## 2020-01-05 NOTE — DISCHARGE NOTE NURSING/CASE MANAGEMENT/SOCIAL WORK - PATIENT PORTAL LINK FT
You can access the FollowMyHealth Patient Portal offered by [facility] by registering at the following website: [email or web address]. By joining [employer]’s FollowMyHealth portal, you will also be able to view your health information using other applications (apps) compatible with our system.

## 2020-01-05 NOTE — DISCHARGE NOTE NURSING/CASE MANAGEMENT/SOCIAL WORK - NSDCPEWEB_GEN_ALL_CORE
[facility] for Tobacco Control website --- [email or web address] website --- [email or web address]

## 2020-01-05 NOTE — PROGRESS NOTE ADULT - SUBJECTIVE AND OBJECTIVE BOX
HOSPITALIST PROGRESS NOTE:  SUBJECTIVE:  PCP: Dr. Lilly  Chief Complaint: Patient is a 84y old  Female who presents with a chief complaint of cough (31 Dec 2019 20:12)      HPI:  85 y/o F with PMH of HTN, CVA, renal cancer s/p nephrectomy, lung cancer s/p chemo (last chemo was 2019 and last immunotherapy was 19), COPD, p/w cough. Cough started 4 days ago and is productive of yellow sputum. Also c/o sore throat and chills. Had a tmax of 101.7 at home. Has mild SOB intermittently and some palpitations. Denies CP, nausea, vomiting, diaphoresis, dysuria, increased frequency  States she took baby ASA at home, and was given more ASA in ED.    20: Above reviewed. patient very weak, slightly short of breath; Daugther at bedside, requesting Dr Kong  :  breathing slighlty better; NO overnight events. seen by cardio and cleared   1/3:  Patient has been wheezing this morning. upset that she has to stay over the weekend; family requesting PT maycol  :  zaidt still constipated has not had a bowel movement today; refusing lipitor, enema; family at bedside who states that her xanax shud be 0.5 TID  :  Pulse ox on ambulation WNL; no overnight events. patient feeling better wants to go home; family at bedside     Allergies:  antihistamines (Unknown)  IV Contrast (Unknown)  PC Pen VK (Unknown)  predniSONE (Unknown)  sulfa drugs (Unknown)    REVIEW OF SYSTEMS:  See HPI. All other review of systems is negative unless indicated above.     OBJECTIVE  Physical Exam:  Vital Signs Last 24 Hrs  T(C): 37 (2020 04:58), Max: 37.1 (2020 17:16)  T(F): 98.6 (2020 04:58), Max: 98.7 (2020 17:16)  HR: 91 (2020 04:58) (81 - 92)  BP: 140/60 (2020 04:58) (127/59 - 141/66)  BP(mean): --  RR: 18 (2020 04:58) (18 - 18)  SpO2: 92% (2020 12:15) (92% - 98%)    Constitutional: NAD, awake and alert, well-developed  Neurological: AAO x 3, no focal deficits  HEENT: PERRLA, EOMI, MMM  Neck: Soft and supple, No LAD, No JVD  Respiratory: Breath sounds are clear bilaterally, No wheezing, rales +rhonchi  Cardiovascular: S1 and S2, regular rate and rhythm; no Murmurs, gallops or rubs  Gastrointestinal: Bowel Sounds present, soft, nontender, nondistended, no guarding, no rebound tenderness  Back: No CVA tenderness   Extremities: No peripheral edema  Vascular: 2+ peripheral pulses  Musculoskeletal: 5/5 strength b/l upper and lower extremities  Skin: No rashes  Breast: Deferred  Rectal: Deferred    MEDICATIONS  (STANDING):  albuterol/ipratropium for Nebulization 3 milliLiter(s) Nebulizer every 6 hours  aspirin enteric coated 81 milliGRAM(s) Oral daily  hydrALAZINE 25 milliGRAM(s) Oral two times a day  levoFLOXacin IVPB 500 milliGRAM(s) IV Intermittent every 24 hours  lisinopril 20 milliGRAM(s) Oral daily  pantoprazole    Tablet 40 milliGRAM(s) Oral before breakfast  sodium chloride 0.9%. 1000 milliLiter(s) (75 mL/Hr) IV Continuous <Continuous>    Lab Results:  CBC  CBC Full  -  ( 2020 07:36 )  WBC Count : 4.59 K/uL  RBC Count : 3.40 M/uL  Hemoglobin : 9.3 g/dL  Hematocrit : 29.6 %  Platelet Count - Automated : 277 K/uL  Mean Cell Volume : 87.1 fl  Mean Cell Hemoglobin : 27.4 pg  Mean Cell Hemoglobin Concentration : 31.4 gm/dL  Auto Neutrophil # : x  Auto Lymphocyte # : x  Auto Monocyte # : x  Auto Eosinophil # : x  Auto Basophil # : x  Auto Neutrophil % : x  Auto Lymphocyte % : x  Auto Monocyte % : x  Auto Eosinophil % : x  Auto Basophil % : x    .		Differential:	[] Automated		[] Manual  Chemistry                        9.3    4.59  )-----------( 277      ( 2020 07:36 )             29.6     01-03    141  |  107  |  11  ----------------------------<  97  3.4<L>   |  27  |  0.58    Ca    9.0      2020 07:36  Phos  2.9     -03  Mg     2.0     -03      MICROBIOLOGY/CULTURES:  Culture Results:   <10,000 CFU/mL Normal Urogenital [place] ( @ 18:05)  Culture Results:   No growth to date. ( @ 15:48)  Culture Results:   No growth to date. ( @ 15:40)        CARDIAC MARKERS ( 31 Dec 2019 23:05 )  0.299 ng/mL / x     / x     / x     / x      CARDIAC MARKERS ( 31 Dec 2019 19:57 )  0.287 ng/mL / x     / x     / x     / x      CARDIAC MARKERS ( 31 Dec 2019 15:40 )  0.316 ng/mL / x     / x     / x     / x            Urinalysis Basic - ( 31 Dec 2019 18:05 )    Color: Yellow / Appearance: Clear / S.005 / pH: x  Gluc: x / Ketone: Negative  / Bili: Negative / Urobili: Negative mg/dL   Blood: x / Protein: Negative mg/dL / Nitrite: Negative   Leuk Esterase: Trace / RBC: 0-2 /HPF / WBC 0-2   Sq Epi: x / Non Sq Epi: x / Bacteria: x      RADIOLOGY/EKG:    < from: Xray Chest 2 Views PA/Lat (19 @ 15:31) >    IMPRESSION: Posterior infiltrate, possibly inthe left lower lobe.      < end of copied text >    < from: CT Chest No Cont (20 @ 13:53) >    IMPRESSION:   Left lower lobe infiltrate  Emphysema  Focal nodular scarring in the right apex  Suggestion of left thyroid nodule. Recommend correlation with thyroid ultrasound on a nonemergent basis. Thyroid nodule is noted on prior CT studies.  Small bilateral pleural effusions  Small pericardial effusion      < end of copied text >    < from: Transthoracic Echocardiogram (20 @ 08:51) >     Impression     Summary     Mild (1+) mitral regurgitation is present.   Mild mitral annular calcification is present.   Mild aortic sclerosis is present with normal valvular opening.   Mild (1+) tricuspid valve regurgitation is present.   Normal appearing pulmonic valve structure and function.   Normal appearing left atrium.   The left ventricle is normal in size, wall thickness, wall motion and   contractility.   Estimated left ventricular ejection fraction is 65-70 %.   Normal appearing right atrium.   Normal appearing right ventricle structure and function.   A small pericardial effusion is present.   Pleural effusion - is present.     Signature      < end of copied text >

## 2020-01-05 NOTE — DISCHARGE NOTE PROVIDER - HOSPITAL COURSE
HOSPITALIST PROGRESS NOTE:    SUBJECTIVE:    PCP: Dr. Lilly    Chief Complaint: Patient is a 84y old  Female who presents with a chief complaint of cough (31 Dec 2019 20:12)            HPI:    85 y/o F with PMH of HTN, CVA, renal cancer s/p nephrectomy, lung cancer s/p chemo (last chemo was 5/21/2019 and last immunotherapy was 11/7/19), COPD, p/w cough. Cough started 4 days ago and is productive of yellow sputum. Also c/o sore throat and chills. Had a tmax of 101.7 at home. Has mild SOB intermittently and some palpitations. Denies CP, nausea, vomiting, diaphoresis, dysuria, increased frequency    States she took baby ASA at home, and was given more ASA in ED.        1/1/20: Above reviewed. patient very weak, slightly short of breath; Daugther at bedside, requesting Dr Kong    1/2:  breathing slighlty better; NO overnight events. seen by cardio and cleared     1/3:  Patient has been wheezing this morning. upset that she has to stay over the weekend; family requesting PT maycol    1/4:  paitent still constipated has not had a bowel movement today; refusing lipitor, enema; family at bedside who states that her xanax shud be 0.5 TID    1/5:  Pulse ox on ambulation WNL; no overnight events. patient feeling better wants to go home; family at bedside         85 y/o F with PMH of HTN, CVA, renal cancer s/p nephrectomy, lung cancer s/p chemo (last chemo was 5/21/2019 and last immunotherapy was 11/7/19), COPD, p/w cough        *Sepsis (fever of 101.7 at home and RR >30) 2/2 HCAP    *Hx of Lung cancer S/P Chemo and Immunotherapy    -CXR - LLL infiltrate     -CT chest - LLL infiltrate, B/L PLeural effusions, small pericardial effusion    -S/P Ceftriaxone / zithromax /Lovenox    -continue cefepime#5 and S/P azithromycin 500mg daily #3/3    -blood cultures - NGTD    -RVP negative    -atrovent Q6H, no albuterol as she get CP/palpitations when taking it    -Pulm consult appreciated     -ID consult appreciated         *Elevated troponins - Possibly 2/2 demand ischemia     -Tele monitoring - no alarms transfer to MS 1/2    -Denies CP     -continue ASA     -Cardio consult appreciated -  outpt ischemic eval when more stable, after infection clear    -Echo  Estimated left ventricular ejection fraction is 65-70 %.        *Microcytic anemia - YVONNE    -No signs / symptoms of bleeding    -F/u outpatient for further work up    -Low Iron levels    -start Ferrlecit 2/5 then switch to ferrous sulfate on D/C        *Hyponatremia - resolved     -S/P IVF and re-check Na         *HTN / CVA    -C/w home meds and f/u outpatient for further management      -Cont asa     -patient low dose statin.          *Constipation    -increase miralax BID, senna, and lactulose; refusing enema        *DVT ppx    -Lovenox        total time 55 minutes

## 2020-01-05 NOTE — DISCHARGE NOTE PROVIDER - CARE PROVIDER_API CALL
Vinayak Webber)  Critical Care Medicine; Internal Medicine; Pulmonary Disease; Sleep Medicine  [address] [place] [address]  Phone: [phone]  Fax: [phone]  Follow Up Time:     Alfonso Lazar)  Cardiovascular Disease; Nuclear Cardiology  [address] [place] [address]  Phone: [phone]  Fax: [phone]  Follow Up Time:

## 2020-01-06 DIAGNOSIS — Z86.59 PERSONAL HISTORY OF OTHER MENTAL AND BEHAVIORAL DISORDERS: ICD-10-CM

## 2020-01-07 PROBLEM — Z86.59 HISTORY OF ANXIETY: Status: RESOLVED | Noted: 2020-01-07 | Resolved: 2020-01-07

## 2020-01-07 RX ORDER — PANTOPRAZOLE 40 MG/1
40 TABLET, DELAYED RELEASE ORAL DAILY
Refills: 0 | Status: ACTIVE | COMMUNITY
Start: 2020-01-07

## 2020-01-07 RX ORDER — CARBOXYMETHYLCELLULOSE SODIUM AND GLYCERIN 5; 9 MG/ML; MG/ML
0.5-0.9 SOLUTION/ DROPS OPHTHALMIC
Refills: 0 | Status: ACTIVE | COMMUNITY
Start: 2020-01-07

## 2020-01-07 RX ORDER — ASPIRIN ENTERIC COATED TABLETS 81 MG 81 MG/1
81 TABLET, DELAYED RELEASE ORAL DAILY
Qty: 100 | Refills: 2 | Status: ACTIVE | COMMUNITY
Start: 2020-01-07

## 2020-01-09 DIAGNOSIS — Z79.899 OTHER LONG TERM (CURRENT) DRUG THERAPY: ICD-10-CM

## 2020-01-09 DIAGNOSIS — Z86.73 PERSONAL HISTORY OF TRANSIENT ISCHEMIC ATTACK (TIA), AND CEREBRAL INFARCTION WITHOUT RESIDUAL DEFICITS: ICD-10-CM

## 2020-01-09 DIAGNOSIS — A41.9 SEPSIS, UNSPECIFIED ORGANISM: ICD-10-CM

## 2020-01-09 DIAGNOSIS — C34.90 MALIGNANT NEOPLASM OF UNSPECIFIED PART OF UNSPECIFIED BRONCHUS OR LUNG: ICD-10-CM

## 2020-01-09 DIAGNOSIS — I24.8 OTHER FORMS OF ACUTE ISCHEMIC HEART DISEASE: ICD-10-CM

## 2020-01-09 DIAGNOSIS — Z88.2 ALLERGY STATUS TO SULFONAMIDES: ICD-10-CM

## 2020-01-09 DIAGNOSIS — K59.00 CONSTIPATION, UNSPECIFIED: ICD-10-CM

## 2020-01-09 DIAGNOSIS — E87.1 HYPO-OSMOLALITY AND HYPONATREMIA: ICD-10-CM

## 2020-01-09 DIAGNOSIS — Z87.891 PERSONAL HISTORY OF NICOTINE DEPENDENCE: ICD-10-CM

## 2020-01-09 DIAGNOSIS — Z85.53 PERSONAL HISTORY OF MALIGNANT NEOPLASM OF RENAL PELVIS: ICD-10-CM

## 2020-01-09 DIAGNOSIS — D50.9 IRON DEFICIENCY ANEMIA, UNSPECIFIED: ICD-10-CM

## 2020-01-09 DIAGNOSIS — Z79.82 LONG TERM (CURRENT) USE OF ASPIRIN: ICD-10-CM

## 2020-01-09 DIAGNOSIS — J15.6 PNEUMONIA DUE TO OTHER GRAM-NEGATIVE BACTERIA: ICD-10-CM

## 2020-01-09 DIAGNOSIS — J44.0 CHRONIC OBSTRUCTIVE PULMONARY DISEASE WITH (ACUTE) LOWER RESPIRATORY INFECTION: ICD-10-CM

## 2020-01-09 DIAGNOSIS — Z88.0 ALLERGY STATUS TO PENICILLIN: ICD-10-CM

## 2020-01-15 ENCOUNTER — APPOINTMENT (OUTPATIENT)
Dept: DERMATOLOGY | Facility: CLINIC | Age: 85
End: 2020-01-15

## 2020-03-08 ENCOUNTER — INPATIENT (INPATIENT)
Facility: HOSPITAL | Age: 85
LOS: 1 days | Discharge: HOME | DRG: 56 | End: 2020-03-10
Attending: FAMILY MEDICINE | Admitting: HOSPITALIST
Payer: MEDICARE

## 2020-03-08 VITALS
OXYGEN SATURATION: 92 % | HEIGHT: 61 IN | TEMPERATURE: 98 F | RESPIRATION RATE: 16 BRPM | WEIGHT: 110.01 LBS | HEART RATE: 90 BPM | SYSTOLIC BLOOD PRESSURE: 164 MMHG | DIASTOLIC BLOOD PRESSURE: 75 MMHG

## 2020-03-08 DIAGNOSIS — I63.9 CEREBRAL INFARCTION, UNSPECIFIED: ICD-10-CM

## 2020-03-08 DIAGNOSIS — Z90.5 ACQUIRED ABSENCE OF KIDNEY: Chronic | ICD-10-CM

## 2020-03-08 LAB
ABO RH CONFIRMATION: SIGNIFICANT CHANGE UP
ALBUMIN SERPL ELPH-MCNC: 2.2 G/DL — LOW (ref 3.3–5)
ALP SERPL-CCNC: 85 U/L — SIGNIFICANT CHANGE UP (ref 40–120)
ALT FLD-CCNC: 16 U/L — SIGNIFICANT CHANGE UP (ref 12–78)
ANION GAP SERPL CALC-SCNC: 5 MMOL/L — SIGNIFICANT CHANGE UP (ref 5–17)
APPEARANCE UR: CLEAR — SIGNIFICANT CHANGE UP
APTT BLD: 22.5 SEC — LOW (ref 27.5–36.3)
AST SERPL-CCNC: 35 U/L — SIGNIFICANT CHANGE UP (ref 15–37)
BASOPHILS # BLD AUTO: 0.11 K/UL — SIGNIFICANT CHANGE UP (ref 0–0.2)
BASOPHILS NFR BLD AUTO: 2.8 % — HIGH (ref 0–2)
BILIRUB SERPL-MCNC: 0.3 MG/DL — SIGNIFICANT CHANGE UP (ref 0.2–1.2)
BILIRUB UR-MCNC: NEGATIVE — SIGNIFICANT CHANGE UP
BUN SERPL-MCNC: 13 MG/DL — SIGNIFICANT CHANGE UP (ref 7–23)
CALCIUM SERPL-MCNC: 8.5 MG/DL — SIGNIFICANT CHANGE UP (ref 8.5–10.1)
CHLORIDE SERPL-SCNC: 103 MMOL/L — SIGNIFICANT CHANGE UP (ref 96–108)
CK SERPL-CCNC: 101 U/L — SIGNIFICANT CHANGE UP (ref 26–192)
CO2 SERPL-SCNC: 26 MMOL/L — SIGNIFICANT CHANGE UP (ref 22–31)
COLOR SPEC: YELLOW — SIGNIFICANT CHANGE UP
CREAT SERPL-MCNC: 0.76 MG/DL — SIGNIFICANT CHANGE UP (ref 0.5–1.3)
DIFF PNL FLD: NEGATIVE — SIGNIFICANT CHANGE UP
EOSINOPHIL # BLD AUTO: 0.16 K/UL — SIGNIFICANT CHANGE UP (ref 0–0.5)
EOSINOPHIL NFR BLD AUTO: 4.1 % — SIGNIFICANT CHANGE UP (ref 0–6)
GLUCOSE BLDC GLUCOMTR-MCNC: 84 MG/DL — SIGNIFICANT CHANGE UP (ref 70–99)
GLUCOSE SERPL-MCNC: 90 MG/DL — SIGNIFICANT CHANGE UP (ref 70–99)
GLUCOSE UR QL: NEGATIVE MG/DL — SIGNIFICANT CHANGE UP
HCT VFR BLD CALC: 37.1 % — SIGNIFICANT CHANGE UP (ref 34.5–45)
HGB BLD-MCNC: 11.8 G/DL — SIGNIFICANT CHANGE UP (ref 11.5–15.5)
IMM GRANULOCYTES NFR BLD AUTO: 0.3 % — SIGNIFICANT CHANGE UP (ref 0–1.5)
INR BLD: 0.98 RATIO — SIGNIFICANT CHANGE UP (ref 0.88–1.16)
KETONES UR-MCNC: NEGATIVE — SIGNIFICANT CHANGE UP
LEUKOCYTE ESTERASE UR-ACNC: ABNORMAL
LYMPHOCYTES # BLD AUTO: 0.73 K/UL — LOW (ref 1–3.3)
LYMPHOCYTES # BLD AUTO: 18.5 % — SIGNIFICANT CHANGE UP (ref 13–44)
MCHC RBC-ENTMCNC: 28 PG — SIGNIFICANT CHANGE UP (ref 27–34)
MCHC RBC-ENTMCNC: 31.8 GM/DL — LOW (ref 32–36)
MCV RBC AUTO: 88.1 FL — SIGNIFICANT CHANGE UP (ref 80–100)
MONOCYTES # BLD AUTO: 0.52 K/UL — SIGNIFICANT CHANGE UP (ref 0–0.9)
MONOCYTES NFR BLD AUTO: 13.2 % — SIGNIFICANT CHANGE UP (ref 2–14)
NEUTROPHILS # BLD AUTO: 2.41 K/UL — SIGNIFICANT CHANGE UP (ref 1.8–7.4)
NEUTROPHILS NFR BLD AUTO: 61.1 % — SIGNIFICANT CHANGE UP (ref 43–77)
NITRITE UR-MCNC: NEGATIVE — SIGNIFICANT CHANGE UP
PH UR: 6 — SIGNIFICANT CHANGE UP (ref 5–8)
PLATELET # BLD AUTO: 244 K/UL — SIGNIFICANT CHANGE UP (ref 150–400)
POTASSIUM SERPL-MCNC: 4.8 MMOL/L — SIGNIFICANT CHANGE UP (ref 3.5–5.3)
POTASSIUM SERPL-SCNC: 4.8 MMOL/L — SIGNIFICANT CHANGE UP (ref 3.5–5.3)
PROT SERPL-MCNC: 5.2 GM/DL — LOW (ref 6–8.3)
PROT UR-MCNC: NEGATIVE MG/DL — SIGNIFICANT CHANGE UP
PROTHROM AB SERPL-ACNC: 10.9 SEC — SIGNIFICANT CHANGE UP (ref 10–12.9)
RBC # BLD: 4.21 M/UL — SIGNIFICANT CHANGE UP (ref 3.8–5.2)
RBC # FLD: 16.6 % — HIGH (ref 10.3–14.5)
SODIUM SERPL-SCNC: 134 MMOL/L — LOW (ref 135–145)
SP GR SPEC: 1.01 — SIGNIFICANT CHANGE UP (ref 1.01–1.02)
TROPONIN I SERPL-MCNC: <0.015 NG/ML — SIGNIFICANT CHANGE UP (ref 0.01–0.04)
UROBILINOGEN FLD QL: NEGATIVE MG/DL — SIGNIFICANT CHANGE UP
WBC # BLD: 3.94 K/UL — SIGNIFICANT CHANGE UP (ref 3.8–10.5)
WBC # FLD AUTO: 3.94 K/UL — SIGNIFICANT CHANGE UP (ref 3.8–10.5)

## 2020-03-08 PROCEDURE — 36415 COLL VENOUS BLD VENIPUNCTURE: CPT

## 2020-03-08 PROCEDURE — 97116 GAIT TRAINING THERAPY: CPT | Mod: GP

## 2020-03-08 PROCEDURE — 83036 HEMOGLOBIN GLYCOSYLATED A1C: CPT

## 2020-03-08 PROCEDURE — 92610 EVALUATE SWALLOWING FUNCTION: CPT | Mod: GN

## 2020-03-08 PROCEDURE — 93880 EXTRACRANIAL BILAT STUDY: CPT

## 2020-03-08 PROCEDURE — 82746 ASSAY OF FOLIC ACID SERUM: CPT

## 2020-03-08 PROCEDURE — 97162 PT EVAL MOD COMPLEX 30 MIN: CPT | Mod: GP

## 2020-03-08 PROCEDURE — 80061 LIPID PANEL: CPT

## 2020-03-08 PROCEDURE — 92523 SPEECH SOUND LANG COMPREHEN: CPT | Mod: GN

## 2020-03-08 PROCEDURE — 80048 BASIC METABOLIC PNL TOTAL CA: CPT

## 2020-03-08 PROCEDURE — 71045 X-RAY EXAM CHEST 1 VIEW: CPT | Mod: 26

## 2020-03-08 PROCEDURE — 93010 ELECTROCARDIOGRAM REPORT: CPT

## 2020-03-08 PROCEDURE — 81001 URINALYSIS AUTO W/SCOPE: CPT

## 2020-03-08 PROCEDURE — 99223 1ST HOSP IP/OBS HIGH 75: CPT | Mod: GC,AI

## 2020-03-08 PROCEDURE — 87086 URINE CULTURE/COLONY COUNT: CPT

## 2020-03-08 PROCEDURE — 82607 VITAMIN B-12: CPT

## 2020-03-08 PROCEDURE — 70450 CT HEAD/BRAIN W/O DYE: CPT | Mod: 26

## 2020-03-08 PROCEDURE — 93306 TTE W/DOPPLER COMPLETE: CPT

## 2020-03-08 PROCEDURE — 70551 MRI BRAIN STEM W/O DYE: CPT

## 2020-03-08 RX ORDER — PANTOPRAZOLE SODIUM 20 MG/1
40 TABLET, DELAYED RELEASE ORAL
Refills: 0 | Status: DISCONTINUED | OUTPATIENT
Start: 2020-03-08 | End: 2020-03-10

## 2020-03-08 RX ORDER — HYDRALAZINE HCL 50 MG
25 TABLET ORAL
Refills: 0 | Status: DISCONTINUED | OUTPATIENT
Start: 2020-03-08 | End: 2020-03-10

## 2020-03-08 RX ORDER — ALPRAZOLAM 0.25 MG
0.25 TABLET ORAL
Refills: 0 | Status: DISCONTINUED | OUTPATIENT
Start: 2020-03-08 | End: 2020-03-10

## 2020-03-08 RX ORDER — ALPRAZOLAM 0.25 MG
1 TABLET ORAL
Qty: 0 | Refills: 0 | DISCHARGE

## 2020-03-08 RX ORDER — FERROUS SULFATE 325(65) MG
325 TABLET ORAL
Refills: 0 | Status: DISCONTINUED | OUTPATIENT
Start: 2020-03-08 | End: 2020-03-10

## 2020-03-08 RX ORDER — ATORVASTATIN CALCIUM 80 MG/1
80 TABLET, FILM COATED ORAL AT BEDTIME
Refills: 0 | Status: DISCONTINUED | OUTPATIENT
Start: 2020-03-08 | End: 2020-03-10

## 2020-03-08 RX ORDER — LISINOPRIL 2.5 MG/1
20 TABLET ORAL DAILY
Refills: 0 | Status: DISCONTINUED | OUTPATIENT
Start: 2020-03-08 | End: 2020-03-10

## 2020-03-08 RX ORDER — POLYETHYLENE GLYCOL 3350 17 G/17G
17 POWDER, FOR SOLUTION ORAL DAILY
Refills: 0 | Status: DISCONTINUED | OUTPATIENT
Start: 2020-03-08 | End: 2020-03-10

## 2020-03-08 RX ORDER — ASPIRIN/CALCIUM CARB/MAGNESIUM 324 MG
81 TABLET ORAL DAILY
Refills: 0 | Status: DISCONTINUED | OUTPATIENT
Start: 2020-03-08 | End: 2020-03-10

## 2020-03-08 RX ORDER — ASPIRIN/CALCIUM CARB/MAGNESIUM 324 MG
325 TABLET ORAL ONCE
Refills: 0 | Status: COMPLETED | OUTPATIENT
Start: 2020-03-08 | End: 2020-03-08

## 2020-03-08 RX ADMIN — Medication 325 MILLIGRAM(S): at 18:19

## 2020-03-08 RX ADMIN — Medication 25 MILLIGRAM(S): at 22:40

## 2020-03-08 RX ADMIN — Medication 0.25 MILLIGRAM(S): at 22:41

## 2020-03-08 NOTE — H&P ADULT - ASSESSMENT
85 yo F  as described above with PMHx significant for CVA (2018) with left residual numbness presenting with bilateral facial numbness. Admitted for TIA/CVA workup.    - Admit to Telemetry    #R/O TIA/Acute CVA  #Facial Numbness  - DDx includes but is not limited to TIA vs. acute CVA vs. recrudescence of old CVA vs. metabolic abnormality  - CT Head in ED negative for acute infarct, hemorrhage or mass effect. Old infarcts noted  - EKG reviewed- NSR  - Telemetry monitoring  - s/p  in ED  - Lipitor 80 qHS  - ASA 81 daily  - F/U Lipid profile, HgbA1c,  B12, folate  - F/U MRI Brain  - F/U ECHO  - F/U Carotid Dopplers  - Neuro checks q4h  - F/U Neurology  - F/U PT  - F/U Speech/Swallow    #HTN  - Cont hydralazine 25 BID  - Cont lisinopril 20 mg  - DASH diet    #Lung Cancer  - On immunotherapy currently  - F/U Heme/Onc (Dr. Kong)    #Protein-calorie malnutrition  - Likely due to poor PO intake  - F/U Nutrition    #Thyroid nodule  - Hx of thyroid nodule  - Thyroid US recommended as per Heme/Onc    #Anxiety  - Cont alprazolam 0.25 prn    #DVT PPx  - SCDs

## 2020-03-08 NOTE — ED ADULT NURSE NOTE - OBJECTIVE STATEMENT
pt presents to ED from home, pt alert and [id number], VSS afebrule, pt states she tok a nap and when she woke up she ahd numbness to  her lips and face, pt states she has had weakness to her left side at baseline since august, pt states she has no difficulty speaking no confusion, pt deneis fall trauam fever, drift noted to left arm and leg with weakness on left side. no facial droop noted. safety maintained will continue to monitor

## 2020-03-08 NOTE — ED STATDOCS - PROGRESS NOTE DETAILS
Mickey FRANKLIN for ED attending, Dr. Cartwright: 85 y/o female with PMHx of HTN, CVA, and lung CA presents to the ED c/o +numbness after waking up from a nap this afternoon. Reports numbness to lips, nose, and neck. Also notes +palpitations. Has residual left sided numbness from previous stroke, says has worsening of numbness since nap today. Endorses +lightheadedness as well. Allergic to IV contrast, PC Pen VK, antihistamines, and sulfa drugs. PCP: Dr. Mckinley Lilly. Will send pt to main ED for further evaluation. Mickey FRANKLIN for ED attending, Dr. Cartwright: 85 y/o female with PMHx of HTN, CVA, and lung CA presents to the ED c/o +numbness after waking up from a nap this afternoon, went to sleep at 2:30 PM. Reports numbness to left side of face including lips, nose, and neck. Also notes +palpitations. Has residual left sided numbness from previous stroke, says has worsening of numbness since nap today. Endorses +lightheadedness as well. Allergic to IV contrast, PC Pen VK, antihistamines, and sulfa drugs. PCP: Dr. Mckinley Lilly. Will send pt to main ED for further evaluation.

## 2020-03-08 NOTE — H&P ADULT - NSHPSOCIALHISTORY_GEN_ALL_CORE
Pt lives at home with her . She generally walks without assistance at baseline but walks with cane when going outdoors. She is a former smoker (quit in 2018). Denies any alcohol or illicit drug use.

## 2020-03-08 NOTE — ED PROVIDER NOTE - OBJECTIVE STATEMENT
85 y/o f with PMHx of HTN, Lung CA, CVA with residual left sided numbness presenting to the ED c/o numbness to left side of face including lips, nose and neck since waking up from a nap this afternoon. Has residual left sided numbness from previous stroke but sx have worsened since nap today. Pt was at baseline previous to nap at 230pm. Denies HA, n/v, fever, chills, any other acute c/o. On baby ASA daily. Allergic to IV contrast, PC Pen VK, antihistamines, and sulfa drugs. PCP: Dr. Mckinley Lilly, Neuro: Dr. Bustillo. 83 y/o f with PMHx of HTN, Lung CA, CVA with residual left sided numbness presenting to the ED c/o numbness to left side of face including lips, nose and neck since waking up from a nap at 330pm this afternoon. Has residual left sided numbness from previous stroke but sx have worsened since nap today. Pt was at baseline prior to taking nap at 230pm, sx began after awaking at 330pm. Denies HA, n/v, fever, chills, any other acute c/o. On baby ASA daily. Allergic to IV contrast, PC Pen VK, antihistamines, and sulfa drugs. PCP: Dr. Mckinley Lilly, Neuro: Dr. Bustillo.

## 2020-03-08 NOTE — ED ADULT NURSE NOTE - CHIEF COMPLAINT QUOTE
Pt reports several days of feeling fatigued and weak with residual left sided weakness unchanged since last CVA per family.  Pt reports feeling numbness to mouth and nose with no facial droop or palsy noted. Pt family reports racing heart at home. Pt denies pain.

## 2020-03-08 NOTE — ED ADULT NURSE REASSESSMENT NOTE - NS ED NURSE REASSESS COMMENT FT1
Pt received from KLAUS DAVIES Pt with family at the bedside. awaiting bed upstairs at this time. pt state she continues to feel numb around her lips; pt educated on her NPO status. All safety being maintained. will ctm

## 2020-03-08 NOTE — ED STATDOCS - NS ED MD EM SELECTION
Patient to CT via wheelchair at this time.       [facility]  87/50/33 [id number] Exp Problem Focused - Mod. Complex

## 2020-03-08 NOTE — H&P ADULT - NSHPPHYSICALEXAM_GEN_ALL_CORE
Vital Signs Last 24 Hrs  T(C): 36.4 (08 Mar 2020 21:26), Max: 36.9 (08 Mar 2020 17:27)  T(F): 97.5 (08 Mar 2020 21:26), Max: 98.5 (08 Mar 2020 17:27)  HR: 66 (08 Mar 2020 21:26) (61 - 90)  BP: 168/71 (08 Mar 2020 21:26) (135/65 - 168/71)  BP(mean): 86 (08 Mar 2020 20:00) (86 - 98)  RR: 16 (08 Mar 2020 21:26) (15 - 18)  SpO2: 99% (08 Mar 2020 21:26) (92% - 100%)

## 2020-03-08 NOTE — ED PROVIDER NOTE - PROGRESS NOTE DETAILS
Mickey Wharton for ED attending Dr. Fregoso: Spoke with Dr. Dyer who agrees pt to be given full ASA and have MRI to further evaluate sx.

## 2020-03-08 NOTE — ED PROVIDER NOTE - NS_ ATTENDINGSCRIBEDETAILS _ED_A_ED_FT
I, Erik Fregoso MD,  performed the initial face to face bedside interview with this patient regarding history of present illness, review of symptoms and relevant past medical, social and family history.  I completed an independent physical examination.    The history, relevant review of systems, past medical and surgical history, medical decision making, and physical examination was documented by the scribe in my presence and I attest to the accuracy of the documentation.

## 2020-03-08 NOTE — H&P ADULT - RS GEN PE MLT RESP DETAILS PC
breath sounds equal/good air movement/airway patent/normal breath sounds equal/good air movement/airway patent/respirations non-labored/normal clear to auscultation bilaterally/airway patent/good air movement/breath sounds equal/respirations non-labored/normal

## 2020-03-08 NOTE — H&P ADULT - HISTORY OF PRESENT ILLNESS
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 Pt notes that following prior CVA she generally has numbness along the left side of her face near her lips, however now feels numb on both sides along nose, lips and neck. Pt has been feeling very fatigued and weak and describes an abnormal sense of taste due to chemo. She denies any fevers, HA, dizziness, chest pain, SOB, nausea or vomiting. She experiences night sweats on occasion. She denies any recent travel or sick contacts. Pt is a former smoker and notes that she quit smoking following her last CVA in 2018. Pt describes having an outpatient MRI recently that showed herniated discs at multiple levels and damage to spinal cord.     In the ED, CT head was negative for any acute infarct, hemorrhage or mass effect. Old infarcts and chronic ischemic changes were noted. Pt was given  mg in ED. 83 yo F with PMHx significant for CVA (2018) with residual left sided numbness, HTN, HLD (not on statin), and lung ca (on immunotherapy) presents to [facility] 2 hrs after experiencing worsening facial numbness after awaking from an afternoon nap. Pt notes that following prior CVA she generally has numbness along the left side of her face near her lips, however now feels numb on both sides along nose, lips and neck. Pt has been feeling very fatigued and weak and describes an abnormal sense of taste due to chemo, attributing to her decreased appetite. She often skips meals and is seldom in the mood to eat much. She denies any fevers, HA, dizziness, chest pain, SOB, nausea or vomiting. She experiences night sweats on occasion. She denies any recent travel or sick contacts. Pt is a former smoker and notes that she quit smoking following her last CVA in 2018. She describes experiencing neck pain after stretching PT exercises. Pt describes having an outpatient MRI of cervical spine recently that showed herniated discs at multiple levels and damage to spinal cord.     In the ED, CT head was negative for any acute infarct, hemorrhage or mass effect. Old infarcts and chronic ischemic changes were noted. Pt was given  mg in ED.

## 2020-03-08 NOTE — H&P ADULT - NSICDXFAMILYHX_GEN_ALL_CORE_FT
FAMILY HISTORY:  Family history of type 1 diabetes mellitus, brother  FH: HTN (hypertension), father

## 2020-03-08 NOTE — H&P ADULT - SENSORY
loss of sensation around perimeter of lips and nose, decreased sensation around bilateral jaw lines. Decreased sensation on left in comparison to right in trunk and upper/lower extremities bilaterally

## 2020-03-08 NOTE — H&P ADULT - NSICDXPASTMEDICALHX_GEN_ALL_CORE_FT
PAST MEDICAL HISTORY:  CVA (cerebral vascular accident)     HTN (hypertension)     Lung cancer     Renal cancer

## 2020-03-08 NOTE — H&P ADULT - ATTENDING COMMENTS
84 year old female patient with narrative as previously stated who presented with facial  and lip numbness currently being admitted for TIA/CVA    -Admit to Tele      #Concern for TIA/CVA  -monitor on tele  -CT head negative for bleed  -get morning MRI brain  -get TTE  -get carotid dopplers  -PT eval  -speech eval  -start on high potency statin     #Hyponatremia  -currently asymptomatic  -encourage oral hydration    #HTN  - on lisinopril    #Hx of CVA  -on asa  -start high potency statin    # Anxiety  -on xanax prn

## 2020-03-08 NOTE — ED ADULT TRIAGE NOTE - CHIEF COMPLAINT QUOTE
Pt reports several days of feeling fatigued and weak with residual left sided weakness unchanged since last CVA per family.  Pt reports feeling numbness to mouth and nose with no facial droop or palsy noted. Pt family reports facing heart at home. Pt denies pain. Pt reports several days of feeling fatigued and weak with residual left sided weakness unchanged since last CVA per family.  Pt reports feeling numbness to mouth and nose with no facial droop or palsy noted. Pt family reports racing heart at home. Pt denies pain.

## 2020-03-09 PROBLEM — I63.9 CEREBRAL INFARCTION, UNSPECIFIED: Chronic | Status: ACTIVE | Noted: 2020-01-05

## 2020-03-09 PROBLEM — C34.90 MALIGNANT NEOPLASM OF UNSPECIFIED PART OF UNSPECIFIED BRONCHUS OR LUNG: Chronic | Status: ACTIVE | Noted: 2020-01-05

## 2020-03-09 PROBLEM — I10 ESSENTIAL (PRIMARY) HYPERTENSION: Chronic | Status: ACTIVE | Noted: 2020-01-05

## 2020-03-09 LAB
ANION GAP SERPL CALC-SCNC: 5 MMOL/L — SIGNIFICANT CHANGE UP (ref 5–17)
BUN SERPL-MCNC: 11 MG/DL — SIGNIFICANT CHANGE UP (ref 7–23)
CALCIUM SERPL-MCNC: 8.6 MG/DL — SIGNIFICANT CHANGE UP (ref 8.5–10.1)
CHLORIDE SERPL-SCNC: 105 MMOL/L — SIGNIFICANT CHANGE UP (ref 96–108)
CHOLEST SERPL-MCNC: 208 MG/DL — HIGH (ref 10–199)
CO2 SERPL-SCNC: 26 MMOL/L — SIGNIFICANT CHANGE UP (ref 22–31)
CREAT SERPL-MCNC: 0.63 MG/DL — SIGNIFICANT CHANGE UP (ref 0.5–1.3)
ESTIMATED AVERAGE GLUCOSE: 100 MG/DL — SIGNIFICANT CHANGE UP (ref 68–114)
FOLATE SERPL-MCNC: 9 NG/ML — SIGNIFICANT CHANGE UP
GLUCOSE SERPL-MCNC: 83 MG/DL — SIGNIFICANT CHANGE UP (ref 70–99)
HBA1C BLD-MCNC: 5.1 % — SIGNIFICANT CHANGE UP (ref 4–5.6)
HDLC SERPL-MCNC: 44 MG/DL — LOW
LIPID PNL WITH DIRECT LDL SERPL: 144 MG/DL — HIGH
POTASSIUM SERPL-MCNC: 4.1 MMOL/L — SIGNIFICANT CHANGE UP (ref 3.5–5.3)
POTASSIUM SERPL-SCNC: 4.1 MMOL/L — SIGNIFICANT CHANGE UP (ref 3.5–5.3)
SODIUM SERPL-SCNC: 136 MMOL/L — SIGNIFICANT CHANGE UP (ref 135–145)
TOTAL CHOLESTEROL/HDL RATIO MEASUREMENT: 4.7 RATIO — SIGNIFICANT CHANGE UP (ref 3.3–7.1)
TRIGL SERPL-MCNC: 102 MG/DL — SIGNIFICANT CHANGE UP (ref 10–149)
VIT B12 SERPL-MCNC: 473 PG/ML — SIGNIFICANT CHANGE UP (ref 232–1245)

## 2020-03-09 PROCEDURE — 99233 SBSQ HOSP IP/OBS HIGH 50: CPT | Mod: GC

## 2020-03-09 PROCEDURE — 70551 MRI BRAIN STEM W/O DYE: CPT | Mod: 26

## 2020-03-09 PROCEDURE — 93880 EXTRACRANIAL BILAT STUDY: CPT | Mod: 26

## 2020-03-09 PROCEDURE — 93306 TTE W/DOPPLER COMPLETE: CPT | Mod: 26

## 2020-03-09 PROCEDURE — 99223 1ST HOSP IP/OBS HIGH 75: CPT

## 2020-03-09 RX ORDER — SENNA PLUS 8.6 MG/1
2 TABLET ORAL AT BEDTIME
Refills: 0 | Status: DISCONTINUED | OUTPATIENT
Start: 2020-03-09 | End: 2020-03-09

## 2020-03-09 RX ORDER — ENOXAPARIN SODIUM 100 MG/ML
40 INJECTION SUBCUTANEOUS DAILY
Refills: 0 | Status: DISCONTINUED | OUTPATIENT
Start: 2020-03-09 | End: 2020-03-10

## 2020-03-09 RX ADMIN — Medication 0.25 MILLIGRAM(S): at 15:06

## 2020-03-09 RX ADMIN — Medication 325 MILLIGRAM(S): at 06:18

## 2020-03-09 RX ADMIN — LISINOPRIL 20 MILLIGRAM(S): 2.5 TABLET ORAL at 06:18

## 2020-03-09 RX ADMIN — Medication 81 MILLIGRAM(S): at 13:30

## 2020-03-09 RX ADMIN — Medication 0.25 MILLIGRAM(S): at 06:22

## 2020-03-09 RX ADMIN — Medication 25 MILLIGRAM(S): at 06:18

## 2020-03-09 RX ADMIN — ENOXAPARIN SODIUM 40 MILLIGRAM(S): 100 INJECTION SUBCUTANEOUS at 13:30

## 2020-03-09 RX ADMIN — Medication 25 MILLIGRAM(S): at 18:13

## 2020-03-09 RX ADMIN — Medication 0.25 MILLIGRAM(S): at 22:24

## 2020-03-09 RX ADMIN — PANTOPRAZOLE SODIUM 40 MILLIGRAM(S): 20 TABLET, DELAYED RELEASE ORAL at 13:30

## 2020-03-09 RX ADMIN — Medication 325 MILLIGRAM(S): at 18:14

## 2020-03-09 RX ADMIN — POLYETHYLENE GLYCOL 3350 17 GRAM(S): 17 POWDER, FOR SOLUTION ORAL at 13:31

## 2020-03-09 NOTE — DIETITIAN INITIAL EVALUATION ADULT. - ENERGY NEEDS
Ht.   61   "        Wt.   51     kg               BMI   21.1               IBW   48    kg               Pt is at  106 %  IBW

## 2020-03-09 NOTE — PROGRESS NOTE ADULT - SUBJECTIVE AND OBJECTIVE BOX
Patient is an 83 y/o female with PMHx of CVA last August with residual left sided weakness, lung cancer, cervical spondylosis presented to hospital for new onset leftsided numbness admitted for possible TIA/CVA. CT head on admission unremarkable. MR head pending. ECHO pending. Neurology consulted.    SUBJECTIVE: Patient was seen and examined at bedside this AM. Endorsing history at time of presentation. States she feels a little more weak than usual, attributes to recent dx of cervical spondylosis for which she goes to massage therapy for. Left sided weakness possibly the same as baseline. No numbness of face at present. No other acute complaints.    REVIEW OF SYSTEMS:  All other review of systems is negative unless indicated above    Vital Signs Last 24 Hrs  T(C): 36.6 (09 Mar 2020 11:14), Max: 36.8 (08 Mar 2020 20:50)  T(F): 97.9 (09 Mar 2020 11:14), Max: 98.2 (08 Mar 2020 20:50)  HR: 80 (09 Mar 2020 11:14) (61 - 80)  BP: 121/67 (09 Mar 2020 11:14) (121/67 - 168/71)  BP(mean): 86 (08 Mar 2020 20:00) (86 - 86)  RR: 18 (09 Mar 2020 11:14) (16 - 18)  SpO2: 95% (09 Mar 2020 11:14) (94% - 99%)    I&O's Summary  CAPILLARY BLOOD GLUCOSE      PHYSICAL EXAM:  Constitutional: NAD, awake and alert, well-developed  HEENT: PERR, EOMI, Normal Hearing, MMM  Neck: Soft and supple, No LAD, No JVD  Respiratory: Breath sounds are clear bilaterally, No wheezing, rales or rhonchi  Cardiovascular: S1 and S2, regular rate and rhythm, no Murmurs, gallops or rubs  Gastrointestinal: Bowel Sounds present, soft, nontender, nondistended, no guarding, no rebound  Extremities: No peripheral edema  Vascular: 2+ peripheral pulses  Neurological: A/O x 3, no focal deficits  Musculoskeletal: 5/5 strength on right side, 4/5 left side, reduced sensation of face on left side, normal gait  Skin: No rashes    MEDICATIONS:  MEDICATIONS  (STANDING):  aspirin enteric coated 81 milliGRAM(s) Oral daily  atorvastatin 80 milliGRAM(s) Oral at bedtime  enoxaparin Injectable 40 milliGRAM(s) SubCutaneous daily  ferrous    sulfate 325 milliGRAM(s) Oral two times a day  hydrALAZINE 25 milliGRAM(s) Oral two times a day  lisinopril 20 milliGRAM(s) Oral daily  pantoprazole    Tablet 40 milliGRAM(s) Oral before breakfast  polyethylene glycol 3350 17 Gram(s) Oral daily      LABS: All Labs Reviewed:                        11.8   3.94  )-----------( 244      ( 08 Mar 2020 17:59 )             37.1     03-09    136  |  105  |  11  ----------------------------<  83  4.1   |  26  |  0.63    Ca    8.6      09 Mar 2020 07:51    TPro  5.2<L>  /  Alb  2.2<L>  /  TBili  0.3  /  DBili  x   /  AST  35  /  ALT  16  /  AlkPhos  85  03-08    PT/INR - ( 08 Mar 2020 17:59 )   PT: 10.9 sec;   INR: 0.98 ratio         PTT - ( 08 Mar 2020 17:59 )  PTT:22.5 sec  CARDIAC MARKERS ( 08 Mar 2020 17:59 )  <0.015 ng/mL / x     / 101 U/L / x     / x        < from: MR Head No Cont (03.09.20 @ 15:54) >  EXAM:  MR BRAIN                            PROCEDURE DATE:  03/09/2020          INTERPRETATION:    MR brain  without gadolinium     CLINICAL INFORMATION: Worsening LEFT facial numbness    TECHNIQUE:   Sagittal and axial T1-weighted images, axial FLAIR images, axial gradient echo and T2-weighted images and axial diffusion weighted images of the brain were obtained.    FINDINGS:   No prior similar studies are available for review.    The brain demonstrates moderate periventricular, deep and vertical white matter ischemia. Incidental developmental venous anomaly is seen in the RIGHT centrum semiovale.   No acute cerebral cortical infarct is found.   No intracranial hemorrhage is recognized. No mass effect is found in the brain.    The ventricles, sulci and basal cisterns appear unremarkable.    The vertebral and internal carotid arteries demonstrate expected flow voids indicating their patency.    The orbits are unremarkable.  The paranasal sinuses are clear.  The nasal cavity appears intact.  The nasopharynx is symmetric.  The central skull base and temporal bones are intact.  The calvarium appears unremarkable.    IMPRESSION:   moderate periventricular, deep and vertical white matter ischemia. Incidental developmental venous anomaly is seen in the RIGHT centrum semiovale.

## 2020-03-09 NOTE — SWALLOW BEDSIDE ASSESSMENT ADULT - SWALLOW EVAL: CRITERIA FOR SKILLED INTERVENTION MET
NO NEED FOR ACUTE SPEECH PATHOLOGY INTERVENTION. PT'S SPEECH-LANGUAGE ABILITIES AND OROPHARYNGEAL SWALLOWING ABILITIES ARE WITHIN FUNCTIONAL PARAMETERS/AT USUAL STATE/ARE FELT TO BE MAXIMIZED. GIVEN ABOVE, WILL NOT ACTIVELY FOLLOW. RECONSULT PRN SHOULD STATUS CHANGE AND CONDITION WARRANT.

## 2020-03-09 NOTE — PROGRESS NOTE ADULT - ASSESSMENT
83 yo F  as described above with PMHx significant for CVA (2018) with left residual numbness presenting with bilateral facial numbness. Admitted for TIA/CVA workup.    #R/O TIA/Acute CVA  #Facial Numbness  - MRI head from today: moderate periventricular, deep and vertical white matter ischemia. Incidental developmental venous anomaly is seen in the RIGHT centrum semiovale.  - CT Head in ED negative for acute infarct, hemorrhage or mass effect. Old infarcts noted  - on ASA 81 QD  - Lipitor 80 q Nightly  -  Lipid profile: elevated LDL, Total cholesterol- continue statin  -HgbA1c,  B12, folate wnl  - Carotid Dopplers: moderate stenosis of left carotid artery 55-60%  - F/U ECHO  -continue  Neuro checks q4h  -  Neurology Consulted, recs appreciated: agree with current plan  - F/U PT  - F/U Speech/Swallow  - continue Telemetry monitoring      #HTN  - Cont hydralazine 25 BID  - Cont lisinopril 20 mg  - DASH diet    #Lung Cancer  - On immunotherapy currently  - Can follow up outpatient with Dr. Kong    #Protein-calorie malnutrition  - Likely due to poor PO intake  - Nutrition recs appreciated: SLP recommends regular texture diet, thin liquids, Encourage PO intake, add gelatein bid    #Thyroid nodule  - Hx of thyroid nodule  - Thyroid US to obtain outpatient as per Heme Onc recommendation    #Anxiety  - Cont alprazolam 0.25 prn    #DVT PPx  - Lovenox 40 QD    Case discussed with Dr. Wang

## 2020-03-09 NOTE — SWALLOW BEDSIDE ASSESSMENT ADULT - COMMENTS
The patient was admitted to  with an increase in pre-existing left sided numbness.  This profile is superimposed upon a history of HTN, HLD, lung cancer on chemo,  renal cancer s/p nephrectomy, and prior CVA with residual left sided numbness NOS.

## 2020-03-09 NOTE — SWALLOW BEDSIDE ASSESSMENT ADULT - SWALLOW EVAL: DIAGNOSIS
1) The pt demonstrates functional Oropharyngeal Swallowing abilities for age without behavioral aspiration signs.  2) The pt was alert and interactive. She reported a longstanding h/o left extremity as well as facial numbness from an old stroke which initially worsened but she feels now returned back to pre-hospitalization state. She was able to verbalize during communicative probes and in conversation via intelligible fluent linguistically intact contextually appropriate utterances. No dysarthria, verbal apraxia or aphasia were demonstrated on clinical exam. Pt was able to verbalize his needs and is at reported communicative baseline. 1) The pt demonstrates functional Oropharyngeal Swallowing abilities for age without behavioral aspiration signs.  2) The pt was alert and interactive. She reported a longstanding h/o left extremity as well as facial numbness from an old stroke which initially worsened but she feels now returned back to pre-hospitalization state. She was able to verbalize during communicative probes and in conversation via intelligible fluent linguistically intact contextually appropriate utterances. No dysarthria, verbal apraxia or aphasia were demonstrated on clinical exam. Pt was able to verbalize her needs and is at reported communicative baseline.

## 2020-03-09 NOTE — DIETITIAN INITIAL EVALUATION ADULT. - DIET TYPE
supplement (specify)/Add gelatein bid,   add Ensure enlive 8 oz tid.  Liberalize diet to regular to maximize intake

## 2020-03-09 NOTE — DIETITIAN INITIAL EVALUATION ADULT. - PERTINENT MEDS FT
MEDICATIONS  (STANDING):  aspirin enteric coated 81 milliGRAM(s) Oral daily  atorvastatin 80 milliGRAM(s) Oral at bedtime  ferrous    sulfate 325 milliGRAM(s) Oral two times a day  hydrALAZINE 25 milliGRAM(s) Oral two times a day  lisinopril 20 milliGRAM(s) Oral daily  pantoprazole    Tablet 40 milliGRAM(s) Oral before breakfast  polyethylene glycol 3350 17 Gram(s) Oral daily    MEDICATIONS  (PRN):  ALPRAZolam 0.25 milliGRAM(s) Oral four times a day PRN Anxiety  bisacodyl 5 milliGRAM(s) Oral every 12 hours PRN Constipation

## 2020-03-09 NOTE — CONSULT NOTE ADULT - ASSESSMENT
85 yo woman PMHx significant for CVA (2018) with left residual numbness presenting with bilateral facial numbness. #R/O CVA  suggest: Telemetry monitoring  - ASA, statin.  - F/U Lipid profile, HgbA1c,  B12, folate  - MRI/mra Brain pending  PT evaluation

## 2020-03-09 NOTE — CHART NOTE - NSCHARTNOTEFT_GEN_A_CORE
Upon Nutritional Assessment by the Registered Dietitian your patient was determined to meet criteria / has evidence of the following diagnosis/diagnoses:          [ ]  Mild Protein Calorie Malnutrition        [ ]  Moderate Protein Calorie Malnutrition        [ x] Severe Protein Calorie Malnutrition        [ ] Unspecified Protein Calorie Malnutrition        [ ] Underweight / BMI <19        [ ] Morbid Obesity / BMI > 40      Findings as based on:  •  Comprehensive nutrition assessment and consultation  •  Calorie counts (nutrient intake analysis)  •  Food acceptance and intake status from observations by staff  •  Follow up  •  Patient education  •  Intervention secondary to interdisciplinary rounds  •   concerns    · Malnutrition  Pt meets criteria for severe protein-calorie malnutrition in context of chronic disease   PO intake < 75% nutritional needs > one month.  NFPE reveals mdoerate muscle wasting  (temples, shoulders, clavicles, scapula, interosseus, thighs, calves)   moderate fat wasting (triceps, ribs.)        Treatment:    The following diet has been recommended:  Liberalize diet to regular to maximize intake  SLP recommends regular texture diet, thin liquids  Encourage PO intake.   add gelatein bid  Record PO intake in EMR after each meal (nursing.)   Monitor PO intake, tolerance, labs and weight.       PROVIDER Section:     By signing this assessment you are acknowledging and agree with the diagnosis/diagnoses assigned by the Registered Dietitian    Comments:

## 2020-03-09 NOTE — PHYSICAL THERAPY INITIAL EVALUATION ADULT - PATIENT PROFILE REVIEW, REHAB EVAL
h/o prior CVA 2018 with residual L facial numbness,former smoker being treated for lung CA with immunotherapy,poor appetite due to unusual taste in mouth attributed to chemoRx/yes

## 2020-03-09 NOTE — PROGRESS NOTE ADULT - ATTENDING COMMENTS
Patient seen and examined with Family Medicine Residents Colleen Thomson, Rufus Plaza, Harpal Joyce and Pharmacy Resident Jay Alberto on the Family Medicine Teaching Service.  Agree with history, physical, labs and plan which were reviewed in detail after a face to face encounter with the patient.

## 2020-03-09 NOTE — DIETITIAN INITIAL EVALUATION ADULT. - OTHER INFO
85 yo F with PMHx significant for CVA (2018) with residual left sided numbness, HTN, HLD (not on statin), and lung ca (on immunotherapy) presents to [facility] 2 hrs after experiencing worsening facial numbness after awaking from an afternoon nap. Pt notes that following prior CVA she generally has numbness along the left side of her face near her lips, however now feels numb on both sides along nose, lips and neck. Pt has been feeling very fatigued and weak and describes an abnormal sense of taste due to chemo, attributing to her decreased appetite. She often skips meals and is seldom in the mood to eat much. She denies any fevers, HA, dizziness, chest pain, SOB, nausea or vomiting. She experiences night sweats on occasion. She denies any recent travel or sick contacts. Pt is a former smoker and notes that she quit smoking following her last CVA in 2018. She describes experiencing neck pain after stretching PT exercises. Pt describes having an outpatient MRI of cervical spine recently that showed herniated discs at multiple levels and damage to spinal cord. 83 yo F with PMHx significant for CVA (2018) with residual left sided numbness, HTN, HLD (not on statin), and lung ca (on immunotherapy) presents to [facility] 2 hrs after experiencing worsening facial numbness after awaking from an afternoon nap. Pt notes that following prior CVA she generally has numbness along the left side of her face near her lips, however now feels numb on both sides along nose, lips and neck. Pt has been feeling very fatigued and weak and describes an abnormal sense of taste due to chemo, attributing to her decreased appetite. She often skips meals and is seldom in the mood to eat much. She denies any fevers, HA, dizziness, chest pain, SOB, nausea or vomiting. She experiences night sweats on occasion. She denies any recent travel or sick contacts. Pt is a former smoker and notes that she quit smoking following her last CVA in 2018.   Pt not in room.  Pt's family reports pt had CVA in 8/2019.  Pt "doesn't eat, has no appetite", and will not listen to encouragement from family. Consult in for swallow eval. 83 yo F with PMHx significant for CVA (2018) with residual left sided numbness, HTN, HLD (not on statin), and lung ca (on immunotherapy) presents to [facility] 2 hrs after experiencing worsening facial numbness after awaking from an afternoon nap. Pt notes that following prior CVA she generally has numbness along the left side of her face near her lips, however now feels numb on both sides along nose, lips and neck. Pt has been feeling very fatigued and weak and describes an abnormal sense of taste due to chemo, attributing to her decreased appetite. She often skips meals and is seldom in the mood to eat much. She denies any fevers, HA, dizziness, chest pain, SOB, nausea or vomiting. She experiences night sweats on occasion. She denies any recent travel or sick contacts. Pt is a former smoker and notes that she quit smoking following her last CVA in 2018.   Pt not in room.  Pt's family reports pt had CVA in 8/2019.  Pt "doesn't eat, has no appetite", and will not listen to encouragement from family. Consult in for swallow eval.  Returned for exam.

## 2020-03-09 NOTE — SWALLOW BEDSIDE ASSESSMENT ADULT - SLP GENERAL OBSERVATIONS
The pt was alert and interactive. She reported a longstanding h/o left extremity as well as facial numbness from an old stroke which initially worsened but she feels now returned back to pre-hospitalization state. She was able to verbalize during communicative probes and in conversation via intelligible fluent linguistically intact contextually appropriate utterances. No dysarthria, verbal apraxia or aphasia were demonstrated on clinical exam. Pt was able to verbalize his needs and is at reported communicative baseline. Note that pt denied Dysphagia. The pt was alert and interactive. She reported a longstanding h/o left extremity as well as facial numbness from an old stroke which initially worsened but she feels now returned back to pre-hospitalization state. She was able to verbalize during communicative probes and in conversation via intelligible fluent linguistically intact contextually appropriate utterances. No dysarthria, verbal apraxia or aphasia were demonstrated on clinical exam. Pt was able to verbalize her needs and is at reported communicative baseline. Note that pt denied Dysphagia.

## 2020-03-09 NOTE — CONSULT NOTE ADULT - SUBJECTIVE AND OBJECTIVE BOX
Neurology Consult requested by:   Patient is a 84y old  Female who presents with a chief complaint of TIA/CVA workup (08 Mar 2020 23:01)     HPI:  85 yo F with PMHx significant for CVA (2018) with residual left sided numbness, HTN, HLD (not on statin), and lung ca (on immunotherapy) presents to ED 2 hrs after experiencing worsening facial and arm numbness after awaking from an afternoon nap. Pt notes that following prior CVA she generally has numbness along the left side of her face near her lips, however now feels numb on both sides along nose, lips and neck. Pt has been feeling very fatigued and weak and describes an abnormal sense of taste due to chemo, attributing to her decreased appetite. Denies any fevers, HA, dizziness, chest pain, SOB, nausea or vomiting.   Has been c/o increasing neck pain, stiffness. Recent MR spine read as mx level spondylosis.        PAST MEDICAL & SURGICAL HISTORY:  Renal cancer  CVA (cerebral vascular accident)  HTN (hypertension)  Lung cancer  H/O right nephrectomy    FAMILY HISTORY:  FH: HTN (hypertension): father  Family history of type 1 diabetes mellitus: brother    Social Hx:  Nonsmoker, no drug or alcohol use  Medications and Allergies ReviewedMEDICATIONS  (STANDING):  aspirin enteric coated 81 milliGRAM(s) Oral daily  atorvastatin 80 milliGRAM(s) Oral at bedtime  enoxaparin Injectable 40 milliGRAM(s) SubCutaneous daily  ferrous    sulfate 325 milliGRAM(s) Oral two times a day  hydrALAZINE 25 milliGRAM(s) Oral two times a day  lisinopril 20 milliGRAM(s) Oral daily  pantoprazole    Tablet 40 milliGRAM(s) Oral before breakfast  polyethylene glycol 3350 17 Gram(s) Oral daily     ROS: Pertinent positives in HPI, all other ROS were reviewed and are negative.      Examination:   Vital Signs Last 24 Hrs  T(C): 36.6 (09 Mar 2020 05:16), Max: 36.9 (08 Mar 2020 17:27)  T(F): 97.9 (09 Mar 2020 05:16), Max: 98.5 (08 Mar 2020 17:27)  HR: 67 (09 Mar 2020 05:16) (61 - 90)  BP: 128/52 (09 Mar 2020 05:16) (128/52 - 168/71)  BP(mean): 86 (08 Mar 2020 20:00) (86 - 98)  RR: 17 (09 Mar 2020 05:16) (15 - 18)  SpO2: 94% (09 Mar 2020 05:16) (92% - 100%)  General: Cooperative, NAD   NECK: supple, no masses  ENT: Normal hearing   Vascular : no carotid bruits,   Lungs: CTAB  Chest: RRR, no murmurs  Extremities: nontender, no edema  Musculoskeletal: no adventitious movements, no joint stiffness  Skin: no rash    Neurological Examination:  NIHSS:  MS: AOx3. Appropriately interactive, normal affect. Speech fluent, repetition, naming, reading is intact   CN: VFFTC, PERLL, EOMI, V1-3 sensation intact, face symmetric, hearing intact, palate elevates symmetrically, tongue midline, SCM equal bilaterally  Motor: normal bulk and tone, no tremor, rigidity or bradykinesia.  5-/5 all over   Sens: reduced on left to light touch.    Reflexes: 1-2/4 all over, downgoing toes b/l  Coord:  No dysmetria, CICI intact   Gait: Cannot test    Labs: Reviewed  Basic Metabolic Panel (03.09.20 @ 07:51)    Sodium, Serum: 136 mmol/L    Potassium, Serum: 4.1 mmol/L    Chloride, Serum: 105 mmol/L    Carbon Dioxide, Serum: 26 mmol/L    Anion Gap, Serum: 5 mmol/L    Blood Urea Nitrogen, Serum: 11 mg/dL    Creatinine, Serum: 0.63 mg/dL    Glucose, Serum: 83 mg/dL    Calcium, Total Serum: 8.6 mg/dL    eGFR if Non : 83: Interpretative comment      Imaging:   < from: CT Brain Stroke Protocol (03.08.20 @ 17:53) >  CLINICAL INFORMATION: Stroke code     TECHNIQUE: Contiguous axial 5 mm sections were obtained through the head. Sagittal and coronal 2-D reformatted images were also obtained.   This scan was performed using automatic exposure control (radiation dose reduction software) to obtain a diagnostic image quality scan with patient dose as low as reasonably achievable.     FINDINGS:   No previous examinations are available for review.    The brain demonstrates mild periventricular white matter ischemia with old lacunar infarctions in the LEFT basal ganglia and RIGHT thalamus.   No acute cerebral cortical infarct is seen.  No intracranial hemorrhage is found.  No mass effect is found in the brain.      The ventricles, sulci and basal cisterns show mild atrophy.    The orbits are unremarkable.  The paranasal sinuses are clear.  The nasal cavity appears intact.  The nasopharynx is symmetric.  The central skull base, petrous temporal bones and calvarium remain intact.      IMPRESSION:    mild periventricular white matter ischemia with old lacunar infarctions in the LEFT basal ganglia and RIGHT thalamus. Mild atrophy.

## 2020-03-09 NOTE — DIETITIAN INITIAL EVALUATION ADULT. - PERTINENT LABORATORY DATA
03-08 Na134 mmol/L<L> Glu 90 mg/dL K+ 4.8 mmol/L Cr  0.76 mg/dL BUN 13 mg/dL Phos n/a   Alb 2.2 g/dL<L> PAB n/a

## 2020-03-10 ENCOUNTER — TRANSCRIPTION ENCOUNTER (OUTPATIENT)
Age: 85
End: 2020-03-10

## 2020-03-10 VITALS
TEMPERATURE: 98 F | RESPIRATION RATE: 18 BRPM | OXYGEN SATURATION: 99 % | SYSTOLIC BLOOD PRESSURE: 98 MMHG | DIASTOLIC BLOOD PRESSURE: 53 MMHG | HEART RATE: 96 BPM

## 2020-03-10 LAB
CULTURE RESULTS: SIGNIFICANT CHANGE UP
SPECIMEN SOURCE: SIGNIFICANT CHANGE UP

## 2020-03-10 PROCEDURE — 99239 HOSP IP/OBS DSCHRG MGMT >30: CPT

## 2020-03-10 RX ORDER — ATORVASTATIN CALCIUM 80 MG/1
1 TABLET, FILM COATED ORAL
Qty: 10 | Refills: 0
Start: 2020-03-10 | End: 2020-03-19

## 2020-03-10 RX ADMIN — PANTOPRAZOLE SODIUM 40 MILLIGRAM(S): 20 TABLET, DELAYED RELEASE ORAL at 05:19

## 2020-03-10 RX ADMIN — POLYETHYLENE GLYCOL 3350 17 GRAM(S): 17 POWDER, FOR SOLUTION ORAL at 11:26

## 2020-03-10 RX ADMIN — Medication 325 MILLIGRAM(S): at 05:19

## 2020-03-10 RX ADMIN — Medication 25 MILLIGRAM(S): at 05:19

## 2020-03-10 RX ADMIN — Medication 81 MILLIGRAM(S): at 11:26

## 2020-03-10 RX ADMIN — LISINOPRIL 20 MILLIGRAM(S): 2.5 TABLET ORAL at 05:19

## 2020-03-10 RX ADMIN — ENOXAPARIN SODIUM 40 MILLIGRAM(S): 100 INJECTION SUBCUTANEOUS at 11:26

## 2020-03-10 RX ADMIN — Medication 0.25 MILLIGRAM(S): at 09:39

## 2020-03-10 NOTE — DISCHARGE NOTE NURSING/CASE MANAGEMENT/SOCIAL WORK - NSDCPEPTSTRK_GEN_ALL_CORE
Stroke education booklet/Call 911 for stroke/Need for follow up after discharge/Stroke warning signs and symptoms/Signs and symptoms of stroke/Prescribed medications/Risk factors for stroke/Stroke support groups for patients, families, and friends

## 2020-03-10 NOTE — DISCHARGE NOTE PROVIDER - NSDCMRMEDTOKEN_GEN_ALL_CORE_FT
ALPRAZolam 0.25 mg oral tablet: 1 tab(s) orally 4 times a day, As Needed - for anxiety  Aspir 81 oral delayed release tablet: 1 tab(s) orally once a day  atorvastatin 80 mg oral tablet: 1 tab(s) orally once a day (at bedtime)  azelastine 137 mcg/inh (0.1%) nasal spray: 1 spray(s) nasal 2 times a day, As Needed  ferrous sulfate 325 mg (65 mg elemental iron) oral tablet: 1 tab(s) orally 2 times a day   hydrALAZINE 25 mg oral tablet: 1 tab(s) orally 2 times a day  lactobacillus acidophilus oral capsule: 1 cap(s) orally 2 times a day   lisinopril 20 mg oral tablet: 1 tab(s) orally once a day  MiraLax oral powder for reconstitution: 17 gram(s) orally once a day   pantoprazole 40 mg oral delayed release tablet: 1 tab(s) orally once a day  Refresh ophthalmic solution: 1 drop(s) to each affected eye 3 times a day, As Needed ALPRAZolam 0.25 mg oral tablet: 1 tab(s) orally 4 times a day, As Needed - for anxiety  Aspir 81 oral delayed release tablet: 1 tab(s) orally once a day  atorvastatin 80 mg oral tablet: 1 tab(s) orally once a day (at bedtime)  azelastine 137 mcg/inh (0.1%) nasal spray: 1 spray(s) nasal 2 times a day, As Needed  ferrous sulfate 325 mg (65 mg elemental iron) oral tablet: 1 tab(s) orally 2 times a day   hydrALAZINE 25 mg oral tablet: 1 tab(s) orally 2 times a day  lisinopril 20 mg oral tablet: 1 tab(s) orally once a day  MiraLax oral powder for reconstitution: 17 gram(s) orally once a day   pantoprazole 40 mg oral delayed release tablet: 1 tab(s) orally once a day  Refresh ophthalmic solution: 1 drop(s) to each affected eye 3 times a day, As Needed

## 2020-03-10 NOTE — PHYSICAL THERAPY INITIAL EVALUATION ADULT - GENERAL OBSERVATIONS, REHAB EVAL
HCT in ED negative for acute pathology; chronic infarct state
Patient received in bed on tele floor, +HM.  Patient denied pain, still c/o lip numbness.

## 2020-03-10 NOTE — PROGRESS NOTE ADULT - ASSESSMENT
85 yo F  as described above with PMHx significant for CVA (2018) with left residual numbness presenting with bilateral facial numbness. Admitted for TIA/CVA workup.    #R/O TIA/Acute CVA  -MRI and CT head demonstrate no acute infarcts  -No abnormalities on telemetry for 24hrs; telemetry discontinued  -ECHO unremarkable save for moderate posterior mitral annular calcification. Final ECHO read had not yet returned prior to discharge. Preliminary read was received at that time, and was the same as the final read. Discharge instructions recommended that PCP follow up on final read.  -Continue aspirin and high dose statin  -Swallow eval completed - appropriate for regular diet  -PT eval completed - Appropriate for home with outpatient PT    #HTN  - Cont hydralazine 25 BID  - Cont lisinopril 20 mg  - DASH diet    #Lung Cancer  - On immunotherapy currently  - Follow up outpatient with Dr. Kong    #Protein-calorie malnutrition  - Likely due to poor PO intake  - Nutrition recs appreciated: SLP recommends regular texture diet, thin liquids, Encourage PO intake, add gelatein bid    #Thyroid nodule  - Hx of thyroid nodule  - Thyroid US to obtain outpatient as per Heme Onc recommendation    #Anxiety  - Cont alprazolam 0.25 prn    #DVT PPx  - Lovenox 40 QD

## 2020-03-10 NOTE — PROGRESS NOTE ADULT - ATTENDING COMMENTS
Patient seen and examined with Family Medicine Residents Colleen Thomson, Luisa Rodriguez and Pharmacy Resident Jay Alberto on the Family Medicine Teaching Service.  Agree with history, physical, labs and plan which were reviewed in detail after a face to face encounter with the patient.

## 2020-03-10 NOTE — PHYSICAL THERAPY INITIAL EVALUATION ADULT - ACTIVE RANGE OF MOTION EXAMINATION, REHAB EVAL
except L shoulder to 100* from weakness/bilateral upper extremity Active ROM was WFL (within functional limits)/bilateral  lower extremity Active ROM was WFL (within functional limits)

## 2020-03-10 NOTE — PROGRESS NOTE ADULT - SUBJECTIVE AND OBJECTIVE BOX
Patient is an 85 y/o female with PMHx of CVA last August with residual left sided weakness, lung cancer, cervical spondylosis presented to hospital for new onset leftsided numbness admitted for possible TIA/CVA. CT head on admission unremarkable.    SUBJECTIVE: Patient was seen and examined at bedside this AM. Reports left sided weakness back to baseline; denies left sided numbness. Feels well and interested in being discharged.     Vital Signs Last 24 Hrs  T(C): 36.7 (10 Mar 2020 11:01), Max: 36.7 (09 Mar 2020 21:55)  T(F): 98.1 (10 Mar 2020 11:01), Max: 98.1 (09 Mar 2020 21:55)  HR: 96 (10 Mar 2020 11:01) (62 - 96)  BP: 98/53 (10 Mar 2020 11:01) (94/56 - 130/59)  BP(mean): --  RR: 18 (10 Mar 2020 11:01) (18 - 18)  SpO2: 99% (10 Mar 2020 11:01) (95% - 99%)    PHYSICAL EXAM:  Constitutional: NAD, awake and alert, well-developed  HEENT: PERR, EOMI, Normal Hearing, MMM  Neck: Soft and supple, No LAD, No JVD  Respiratory: Breath sounds are clear bilaterally, No wheezing, rales or rhonchi  Cardiovascular: S1 and S2, regular rate and rhythm, no Murmurs, gallops or rubs  Gastrointestinal: Bowel Sounds present, soft, nontender, nondistended  Extremities: No peripheral edema  Musculoskeletal: 5/5 strength on right side, 4/5 left side  Skin: No rashes    MEDICATIONS  (STANDING):  aspirin enteric coated 81 milliGRAM(s) Oral daily  atorvastatin 80 milliGRAM(s) Oral at bedtime  enoxaparin Injectable 40 milliGRAM(s) SubCutaneous daily  ferrous    sulfate 325 milliGRAM(s) Oral two times a day  hydrALAZINE 25 milliGRAM(s) Oral two times a day  lisinopril 20 milliGRAM(s) Oral daily  pantoprazole    Tablet 40 milliGRAM(s) Oral before breakfast  polyethylene glycol 3350 17 Gram(s) Oral daily    MEDICATIONS  (PRN):  ALPRAZolam 0.25 milliGRAM(s) Oral four times a day PRN Anxiety  bisacodyl 5 milliGRAM(s) Oral every 12 hours PRN Constipation      LABS:  03-08 @ 17:59 Creatine 101 U/L [26 - 192]  cret                        11.8   3.94  )-----------( 244      ( 08 Mar 2020 17:59 )             37.1     03-09    136  |  105  |  11  ----------------------------<  83  4.1   |  26  |  0.63    Ca    8.6      09 Mar 2020 07:51    TPro  5.2<L>  /  Alb  2.2<L>  /  TBili  0.3  /  DBili  x   /  AST  35  /  ALT  16  /  AlkPhos  85  03-08    PT/INR - ( 08 Mar 2020 17:59 )   PT: 10.9 sec;   INR: 0.98 ratio         PTT - ( 08 Mar 2020 17:59 )  PTT:22.5 sec          < from: MR Head No Cont (03.09.20 @ 15:54) >  EXAM:  MR BRAIN                            PROCEDURE DATE:  03/09/2020          INTERPRETATION:    MR brain  without gadolinium     CLINICAL INFORMATION: Worsening LEFT facial numbness    TECHNIQUE:   Sagittal and axial T1-weighted images, axial FLAIR images, axial gradient echo and T2-weighted images and axial diffusion weighted images of the brain were obtained.    FINDINGS:   No prior similar studies are available for review.    The brain demonstrates moderate periventricular, deep and vertical white matter ischemia. Incidental developmental venous anomaly is seen in the RIGHT centrum semiovale.   No acute cerebral cortical infarct is found.   No intracranial hemorrhage is recognized. No mass effect is found in the brain.    The ventricles, sulci and basal cisterns appear unremarkable.    The vertebral and internal carotid arteries demonstrate expected flow voids indicating their patency.    The orbits are unremarkable.  The paranasal sinuses are clear.  The nasal cavity appears intact.  The nasopharynx is symmetric.  The central skull base and temporal bones are intact.  The calvarium appears unremarkable.    IMPRESSION:   moderate periventricular, deep and vertical white matter ischemia. Incidental developmental venous anomaly is seen in the RIGHT centrum semiovale.    < from: TTE Echo Complete w/o contrast w/ Doppler (03.09.20 @ 11:46) >  Impression     Summary     The mitral valve was well visualized. The anterior mitral valve leaflets   appear thin and normal. Moderate posterior mitral annular calcification   noted. EA reversal of the mitral inflow consistent with reduced compliance   of the left ventricle. No mitral regurgitation is present.   Fibrocalcific changes noted to the Aortic valve leaflets with preserved   leaflet excursion. No aortic regurgitation is present.   Normal appearing tricuspid valve structure and function.   Normal appearing pulmonic valve structure and function.   Left ventricular wall motion is normal. The left ventricle is normal in   size.   Estimated left ventricular ejection fraction is 60-65 %.   Normal appearing right atrium.   Normal appearing right ventricle structure and function.   All visualized extra cardiac structures appears to be normal.   No evidence of pericardial effusion.    < end of copied text >

## 2020-03-10 NOTE — DISCHARGE NOTE PROVIDER - NSDCCPCAREPLAN_GEN_ALL_CORE_FT
PRINCIPAL DISCHARGE DIAGNOSIS  Diagnosis: CVA, old, alterations of sensations  Assessment and Plan of Treatment: - Patient was admitted for workup of possible TIA/CVA after having an episode of abrupt facial numbness in the setting of stroke hx from last August  - CT head and MRI of head unremarkable for an acute stroke or TIA  - Bilateral carotid doppler revealing of 55-60% stenosis of left internal carotid artery  - Patients symptoms at time of presentation resolved to baselin- patient with residual left sided weakness from last CVA  - Received daily aspirin and started on statin medication  - Neurology team consulted  - Cardiology team consulted    - ECHO was obtained on this hospitalization  - Recommend follow up with PCP within 1 week of discharge: Dr. Lilly      SECONDARY DISCHARGE DIAGNOSES  Diagnosis: Hyperlipidemia  Assessment and Plan of Treatment: - Lipid panel obtained on admission, found to have eleavted total cholesterol/LDL levels  - started on daily atorvastin 80 mg nightly  - Patient to continue this medication after discharge  - Recommend follow up with PCP after discharge    Diagnosis: Cervical spondylosis  Assessment and Plan of Treatment: - Patient with recent hx of cervical spondylosis  - No acute intervention during this hospitalization  - Recommend follow up with PCP    Diagnosis: HTN (hypertension)  Assessment and Plan of Treatment: - Patient to continue home medication: Hydralazine and Lisinopril as prescribed    Diagnosis: Thyroid nodule  Assessment and Plan of Treatment: -Patient with history of thryoid nodule  - No acute intervention during this hospitalization  - Recommend follow up with PCP

## 2020-03-10 NOTE — DISCHARGE NOTE PROVIDER - HOSPITAL COURSE
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 Patient received stat dose of aspirin, which she received daily during hospitalization. Also started on statin- lipid panel demonstrating elevated total cholesterol/LDL levels. Duplex of the carotids demonstrated left sided stenosis of 55-60%. CT head on admission unremarkable. MR head demonstrating periventricular, deep and white matter ischemia. ECHO was  Neurology was consulted. Cardiology was consulted. Patient is currently medically stable and optimized for discharge to home today. Patient to continue all home medications with the addition of daily atorvastatin 80 mg. Recommend follow up with PCP within 1 week of discharge.        Vital Signs (24 Hrs):    T(C): 36.3 (03-10-20 @ 05:07), Max: 36.7 (03-09-20 @ 21:55)    HR: 70 (03-10-20 @ 05:07) (62 - 80)    BP: 130/59 (03-10-20 @ 05:07) (94/56 - 130/59)    RR: 18 (03-09-20 @ 21:55) (18 - 18)    SpO2: 98% (03-10-20 @ 05:07) (95% - 98%)        PHYSICAL EXAM:    GENERAL: NAD, lying in bed comfortably    HEAD:  Atraumatic, Normocephalic    EYES: EOMI, PERRLA, conjunctiva and sclera clear    ENT: Moist mucous membranes    NECK: Supple, No JVD    CHEST/LUNG: Clear to auscultation bilaterally; No rales, rhonchi, wheezing, or rubs. Unlabored respirations    HEART: Regular rate and rhythm; No murmurs, rubs, or gallops    ABDOMEN: Bowel sounds present; Soft, Nontender, Nondistended. No hepatomegally    EXTREMITIES:  2+ Peripheral Pulses, brisk capillary refill. No clubbing, cyanosis, or edema    NERVOUS SYSTEM:  Alert & Oriented X3, speech clear. No deficits     MSK: 5/5 strength of right sided Upper/Lower extremity, 4/5 of left upper/lower extremity    SKIN: No rashes or lesions                EXAM:  US DPLX CAROTIDS COMPL BI                          PROCEDURE DATE:  03/09/2020      INTERPRETATION:  Clinical information: Facial numbness. History of hypertension.        TECHNIQUE:  Carotid artery ultrasound was performed utilizing grayscale, color-flow and spectral Doppler imaging.        COMPARISON:  None        FINDINGS:     Right side:     Common carotid artery: Intimal thickening but no hemodynamically significant stenosis. Peak systolic velocity is 89  cm/s.        Carotid bulb and internal carotid artery: Moderate calcified plaque at the bulb and in the proximal internal carotid artery but no hemodynamically significant stenosis. Peak systolic velocity is 110 cm/s.    Vertebral artery:  Patent with antegrade flow    External carotid artery:  There is no significant plaque or stenosis.    Left side:    Common carotid artery:   Intimal thickening but no hemodynamically significant stenosis.  Peak systolic velocity is 93  cm/s.    Internal carotid artery and bulb:  Moderate amount of calcified plaque at the bulb and in the internal carotid artery, with elevated peak systolic velocities and spectral broadening. Maximum peak systolic velocity is 161 cm/s.      Vertebral artery:  Widely patent with antegrade flow    External carotid artery:  There is no significant plaque or stenosis.    IMPRESSION: Moderately elevated peak systolic velocities in the left internal carotid artery compatible with moderate  (50-69%) stenosis.            < from: MR Head No Cont (03.09.20 @ 15:54) >        XAM:  MR BRAIN                                  PROCEDURE DATE:  03/09/2020                  INTERPRETATION:      MR brain  without gadolinium         CLINICAL INFORMATION: Worsening LEFT facial numbness        TECHNIQUE:   Sagittal and axial T1-weighted images, axial FLAIR images, axial gradient echo and T2-weighted images and axial diffusion weighted images of the brain were obtained.        FINDINGS:   No prior similar studies are available for review.        The brain demonstrates moderate periventricular, deep and vertical white matter ischemia. Incidental developmental venous anomaly is seen in the RIGHT centrum semiovale.   No acute cerebral cortical infarct is found.   No intracranial hemorrhage is recognized. No mass effect is found in the brain.        The ventricles, sulci and basal cisterns appear unremarkable.        The vertebral and internal carotid arteries demonstrate expected flow voids indicating their patency.        The orbits are unremarkable.  The paranasal sinuses are clear.  The nasal cavity appears intact.  The nasopharynx is symmetric.  The central skull base and temporal bones are intact.  The calvarium appears unremarkable.        IMPRESSION:   moderate periventricular, deep and vertical white matter ischemia. Incidental developmental venous anomaly is seen in the RIGHT centrum semiovale. 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 Patient received stat dose of aspirin, which she received daily during hospitalization. Also started on statin- lipid panel demonstrating elevated total cholesterol/LDL levels. Duplex of the carotids demonstrated left sided stenosis of 55-60%. CT head on admission unremarkable. MR head demonstrating periventricular, deep and white matter ischemia. Preliminary ECHO did not demonstrate PFO; final ECHO will need to be followed up.  Neurology was consulted. Cardiology was consulted. Patient is currently medically stable and optimized for discharge to home today. Patient to continue all home medications with the addition of daily atorvastatin 80 mg. Recommend follow up with PCP within 1 week of discharge.        Vital Signs (24 Hrs):    T(C): 36.3 (03-10-20 @ 05:07), Max: 36.7 (03-09-20 @ 21:55)    HR: 70 (03-10-20 @ 05:07) (62 - 80)    BP: 130/59 (03-10-20 @ 05:07) (94/56 - 130/59)    RR: 18 (03-09-20 @ 21:55) (18 - 18)    SpO2: 98% (03-10-20 @ 05:07) (95% - 98%)        PHYSICAL EXAM:    GENERAL: NAD, lying in bed comfortably    HEAD:  Atraumatic, Normocephalic    EYES: EOMI, PERRLA, conjunctiva and sclera clear    ENT: Moist mucous membranes    NECK: Supple, No JVD    CHEST/LUNG: Clear to auscultation bilaterally; No rales, rhonchi, wheezing, or rubs. Unlabored respirations    HEART: Regular rate and rhythm; No murmurs, rubs, or gallops    ABDOMEN: Bowel sounds present; Soft, Nontender, Nondistended. No hepatomegally    EXTREMITIES:  2+ Peripheral Pulses, brisk capillary refill. No clubbing, cyanosis, or edema    NERVOUS SYSTEM:  Alert & Oriented X3, speech clear. No deficits     MSK: 5/5 strength of right sided Upper/Lower extremity, 4/5 of left upper/lower extremity    SKIN: No rashes or lesions                EXAM:  US DPLX CAROTIDS COMPL BI                          PROCEDURE DATE:  03/09/2020      INTERPRETATION:  Clinical information: Facial numbness. History of hypertension.        TECHNIQUE:  Carotid artery ultrasound was performed utilizing grayscale, color-flow and spectral Doppler imaging.        COMPARISON:  None        FINDINGS:     Right side:     Common carotid artery: Intimal thickening but no hemodynamically significant stenosis. Peak systolic velocity is 89  cm/s.        Carotid bulb and internal carotid artery: Moderate calcified plaque at the bulb and in the proximal internal carotid artery but no hemodynamically significant stenosis. Peak systolic velocity is 110 cm/s.    Vertebral artery:  Patent with antegrade flow    External carotid artery:  There is no significant plaque or stenosis.    Left side:    Common carotid artery:   Intimal thickening but no hemodynamically significant stenosis.  Peak systolic velocity is 93  cm/s.    Internal carotid artery and bulb:  Moderate amount of calcified plaque at the bulb and in the internal carotid artery, with elevated peak systolic velocities and spectral broadening. Maximum peak systolic velocity is 161 cm/s.      Vertebral artery:  Widely patent with antegrade flow    External carotid artery:  There is no significant plaque or stenosis.    IMPRESSION: Moderately elevated peak systolic velocities in the left internal carotid artery compatible with moderate  (50-69%) stenosis.            < from: MR Head No Cont (03.09.20 @ 15:54) >        XAM:  MR BRAIN                                  PROCEDURE DATE:  03/09/2020                  INTERPRETATION:      MR brain  without gadolinium         CLINICAL INFORMATION: Worsening LEFT facial numbness        TECHNIQUE:   Sagittal and axial T1-weighted images, axial FLAIR images, axial gradient echo and T2-weighted images and axial diffusion weighted images of the brain were obtained.        FINDINGS:   No prior similar studies are available for review.        The brain demonstrates moderate periventricular, deep and vertical white matter ischemia. Incidental developmental venous anomaly is seen in the RIGHT centrum semiovale.   No acute cerebral cortical infarct is found.   No intracranial hemorrhage is recognized. No mass effect is found in the brain.        The ventricles, sulci and basal cisterns appear unremarkable.        The vertebral and internal carotid arteries demonstrate expected flow voids indicating their patency.        The orbits are unremarkable.  The paranasal sinuses are clear.  The nasal cavity appears intact.  The nasopharynx is symmetric.  The central skull base and temporal bones are intact.  The calvarium appears unremarkable.        IMPRESSION:   moderate periventricular, deep and vertical white matter ischemia. Incidental developmental venous anomaly is seen in the RIGHT centrum semiovale.            ECHO preliminary read:    EF 60-65%, no significant tricuspid regurgitaiton or aortic insuffiencey, mild diastolic dysfunction, no effusion, moderate posterior annular calcification

## 2020-03-10 NOTE — PHYSICAL THERAPY INITIAL EVALUATION ADULT - PERTINENT HX OF CURRENT PROBLEM, REHAB EVAL
pt c/o increasing facial numbness including R side ,has baseline L facial numbness from prior CVA 2018 ; had MRI neck as OP revealing C/S DDD ,"spinal cord damage"
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   CT/MRI head (-) acute findings.

## 2020-03-10 NOTE — PHYSICAL THERAPY INITIAL EVALUATION ADULT - MODALITIES TREATMENT COMMENTS
Patient left OOB in chair with CBIR. Patient instructed to call for assistance back to bed or the bathroom, understands same. Patient independent in functional mobility, no skilled physical therapy need in this setting.  May ambulate per nursing.  Will d/c from PT. RN, CM informed.

## 2020-03-10 NOTE — PHYSICAL THERAPY INITIAL EVALUATION ADULT - DIAGNOSIS, PT EVAL
increased B facial numbness r/o TIA/CVA ,lung CA on immunotherapy
R/O TIA/Acute CVA, Facial Numbness

## 2020-03-10 NOTE — DISCHARGE NOTE PROVIDER - PROVIDER TOKENS
FREE:[LAST:[Termini],FIRST:[[place]],PHONE:[phone]],FAX:[(   [phone]    -],ADDRESS:[[address] [place] [address]]]

## 2020-03-10 NOTE — DISCHARGE NOTE NURSING/CASE MANAGEMENT/SOCIAL WORK - PATIENT PORTAL LINK FT
You can access the FollowMyHealth Patient Portal offered by [facility] by registering at the following website: [email or web address]. By joining [employer]’s FollowMyHealth portal, you will also be able to view your health information using other applications (apps) compatible with our system.

## 2020-03-10 NOTE — PHYSICAL THERAPY INITIAL EVALUATION ADULT - ADDITIONAL COMMENTS
Patient no longer drives, mostly stays inside the home.  Patient reported being "afraid" to exercise at home as it increases her heart rate. Advised to speak to her cardiologist.

## 2020-03-16 DIAGNOSIS — C34.90 MALIGNANT NEOPLASM OF UNSPECIFIED PART OF UNSPECIFIED BRONCHUS OR LUNG: ICD-10-CM

## 2020-03-16 DIAGNOSIS — I69.354 HEMIPLEGIA AND HEMIPARESIS FOLLOWING CEREBRAL INFARCTION AFFECTING LEFT NON-DOMINANT SIDE: ICD-10-CM

## 2020-03-16 DIAGNOSIS — E43 UNSPECIFIED SEVERE PROTEIN-CALORIE MALNUTRITION: ICD-10-CM

## 2020-03-16 DIAGNOSIS — Z91.09 OTHER ALLERGY STATUS, OTHER THAN TO DRUGS AND BIOLOGICAL SUBSTANCES: ICD-10-CM

## 2020-03-16 DIAGNOSIS — I69.392 FACIAL WEAKNESS FOLLOWING CEREBRAL INFARCTION: ICD-10-CM

## 2020-03-16 DIAGNOSIS — Z88.2 ALLERGY STATUS TO SULFONAMIDES: ICD-10-CM

## 2020-03-16 DIAGNOSIS — I10 ESSENTIAL (PRIMARY) HYPERTENSION: ICD-10-CM

## 2020-03-16 DIAGNOSIS — Z85.528 PERSONAL HISTORY OF OTHER MALIGNANT NEOPLASM OF KIDNEY: ICD-10-CM

## 2020-03-16 DIAGNOSIS — E87.1 HYPO-OSMOLALITY AND HYPONATREMIA: ICD-10-CM

## 2020-03-16 DIAGNOSIS — Z91.041 RADIOGRAPHIC DYE ALLERGY STATUS: ICD-10-CM

## 2020-03-16 DIAGNOSIS — Z88.0 ALLERGY STATUS TO PENICILLIN: ICD-10-CM

## 2020-03-16 DIAGNOSIS — Z87.891 PERSONAL HISTORY OF NICOTINE DEPENDENCE: ICD-10-CM

## 2020-03-16 DIAGNOSIS — Z79.82 LONG TERM (CURRENT) USE OF ASPIRIN: ICD-10-CM

## 2020-03-16 DIAGNOSIS — E78.5 HYPERLIPIDEMIA, UNSPECIFIED: ICD-10-CM

## 2020-03-16 DIAGNOSIS — E04.1 NONTOXIC SINGLE THYROID NODULE: ICD-10-CM

## 2020-03-16 DIAGNOSIS — M47.812 SPONDYLOSIS WITHOUT MYELOPATHY OR RADICULOPATHY, CERVICAL REGION: ICD-10-CM

## 2020-09-16 ENCOUNTER — APPOINTMENT (OUTPATIENT)
Dept: DERMATOLOGY | Facility: CLINIC | Age: 85
End: 2020-09-16
Payer: MEDICARE

## 2020-09-16 PROCEDURE — 99214 OFFICE O/P EST MOD 30 MIN: CPT | Mod: 25

## 2020-09-16 PROCEDURE — 17110 DESTRUCTION B9 LES UP TO 14: CPT

## 2020-10-01 ENCOUNTER — TRANSCRIPTION ENCOUNTER (OUTPATIENT)
Age: 85
End: 2020-10-01

## 2020-10-14 NOTE — PHYSICAL THERAPY INITIAL EVALUATION ADULT - PHYSICAL ASSIST/NONPHYSICAL ASSIST: SIT/SUPINE, REHAB EVAL
From: Triston Bentley  To: Easton Marc  Sent: 10/14/2020 10:20 AM CDT  Subject: Referral Request    Good morning Dr. Marc,     I would like to request a referral to the [facility] in [facility] for DR. Odalis Drew. [facility] [place] and they do have a lot of cases this extensive under their belt as you said I should look for. Thank you for your time.     thank you,   Triston Bentley  
supervision/1 person assist/verbal cues

## 2020-12-16 PROBLEM — C64.9 MALIGNANT NEOPLASM OF UNSPECIFIED KIDNEY, EXCEPT RENAL PELVIS: Chronic | Status: ACTIVE | Noted: 2020-03-08

## 2021-01-29 ENCOUNTER — APPOINTMENT (OUTPATIENT)
Dept: DERMATOLOGY | Facility: CLINIC | Age: 86
End: 2021-01-29
Payer: MEDICARE

## 2021-01-29 PROCEDURE — 10040 EXTRACTION: CPT

## 2021-01-29 PROCEDURE — 99213 OFFICE O/P EST LOW 20 MIN: CPT | Mod: 25

## 2021-03-20 ENCOUNTER — TRANSCRIPTION ENCOUNTER (OUTPATIENT)
Age: 86
End: 2021-03-20

## 2021-07-23 NOTE — PATIENT PROFILE ADULT - CENTRAL VENOUS CATHETER/PICC LINE
Call to patient with no answer. Left voicemail to return senders call.  Will send patient Wilbert Criers [employer] message with physician recommendations as it is important information for patient to have over the weekend. Please see Criers [employer] message.     no

## 2022-04-07 ENCOUNTER — APPOINTMENT (OUTPATIENT)
Dept: DERMATOLOGY | Facility: CLINIC | Age: 87
End: 2022-04-07

## 2022-04-28 DIAGNOSIS — F43.21 ADJUSTMENT DISORDER WITH DEPRESSED MOOD: ICD-10-CM

## 2022-04-28 DIAGNOSIS — Z86.69 PERSONAL HISTORY OF OTHER DISEASES OF THE NERVOUS SYSTEM AND SENSE ORGANS: ICD-10-CM

## 2022-04-28 DIAGNOSIS — Z86.2 PERSONAL HISTORY OF DISEASES OF THE BLOOD AND BLOOD-FORMING ORGANS AND CERTAIN DISORDERS INVOLVING THE IMMUNE MECHANISM: ICD-10-CM

## 2022-04-28 DIAGNOSIS — K21.9 GASTRO-ESOPHAGEAL REFLUX DISEASE W/OUT ESOPHAGITIS: ICD-10-CM

## 2022-04-28 DIAGNOSIS — Z86.39 PERSONAL HISTORY OF OTHER ENDOCRINE, NUTRITIONAL AND METABOLIC DISEASE: ICD-10-CM

## 2022-04-28 DIAGNOSIS — G56.93 UNSPECIFIED MONONEUROPATHY OF BILATERAL UPPER LIMBS: ICD-10-CM

## 2022-04-28 DIAGNOSIS — Z86.73 PERSONAL HISTORY OF TRANSIENT ISCHEMIC ATTACK (TIA), AND CEREBRAL INFARCTION W/OUT RESIDUAL DEFICITS: ICD-10-CM

## 2022-04-28 RX ORDER — AZELASTINE HYDROCHLORIDE 137 UG/1
137 SPRAY, METERED NASAL
Qty: 30 | Refills: 0 | Status: DISCONTINUED | COMMUNITY
Start: 2022-04-04

## 2022-04-28 RX ORDER — METOPROLOL SUCCINATE 25 MG/1
25 TABLET, EXTENDED RELEASE ORAL
Qty: 45 | Refills: 3 | Status: ACTIVE | COMMUNITY
Start: 2022-04-21

## 2022-04-28 RX ORDER — AZELASTINE HYDROCHLORIDE 137 UG/1
0.1 SPRAY, METERED NASAL
Refills: 0 | Status: ACTIVE | COMMUNITY

## 2022-04-28 RX ORDER — KETOCONAZOLE 20 MG/G
2 CREAM TOPICAL
Qty: 60 | Refills: 0 | Status: ACTIVE | COMMUNITY
Start: 2021-12-27

## 2022-04-28 RX ORDER — HYDRALAZINE HYDROCHLORIDE 25 MG/1
25 TABLET ORAL 3 TIMES DAILY
Qty: 270 | Refills: 1 | Status: ACTIVE | COMMUNITY
Start: 2020-01-07

## 2022-04-28 RX ORDER — HYDRALAZINE HYDROCHLORIDE 50 MG/1
50 TABLET ORAL
Qty: 270 | Refills: 0 | Status: DISCONTINUED | COMMUNITY
Start: 2021-12-13

## 2022-04-28 RX ORDER — LISINOPRIL 20 MG/1
20 TABLET ORAL DAILY
Qty: 90 | Refills: 1 | Status: ACTIVE | COMMUNITY

## 2022-04-28 RX ORDER — ALPRAZOLAM 0.5 MG/1
0.5 TABLET ORAL
Refills: 0 | Status: ACTIVE | COMMUNITY

## 2022-05-04 ENCOUNTER — APPOINTMENT (OUTPATIENT)
Dept: CARDIOLOGY | Facility: CLINIC | Age: 87
End: 2022-05-04
Payer: MEDICARE

## 2022-05-04 ENCOUNTER — NON-APPOINTMENT (OUTPATIENT)
Age: 87
End: 2022-05-04

## 2022-05-04 VITALS
RESPIRATION RATE: 16 BRPM | BODY MASS INDEX: 21.35 KG/M2 | HEIGHT: 62 IN | SYSTOLIC BLOOD PRESSURE: 130 MMHG | HEART RATE: 80 BPM | DIASTOLIC BLOOD PRESSURE: 60 MMHG | WEIGHT: 116 LBS

## 2022-05-04 DIAGNOSIS — R03.0 ELEVATED BLOOD-PRESSURE READING, W/OUT DIAGNOSIS OF HYPERTENSION: ICD-10-CM

## 2022-05-04 DIAGNOSIS — J44.9 CHRONIC OBSTRUCTIVE PULMONARY DISEASE, UNSPECIFIED: ICD-10-CM

## 2022-05-04 DIAGNOSIS — R93.89 ABNORMAL FINDINGS ON DIAGNOSTIC IMAGING OF OTHER SPECIFIED BODY STRUCTURES: ICD-10-CM

## 2022-05-04 DIAGNOSIS — Z86.39 PERSONAL HISTORY OF OTHER ENDOCRINE, NUTRITIONAL AND METABOLIC DISEASE: ICD-10-CM

## 2022-05-04 PROCEDURE — 99204 OFFICE O/P NEW MOD 45 MIN: CPT

## 2022-05-04 PROCEDURE — 93000 ELECTROCARDIOGRAM COMPLETE: CPT

## 2022-05-04 RX ORDER — UMECLIDINIUM BROMIDE AND VILANTEROL TRIFENATATE 62.5; 25 UG/1; UG/1
62.5-25 POWDER RESPIRATORY (INHALATION) DAILY
Qty: 1 | Refills: 5 | Status: DISCONTINUED | COMMUNITY
Start: 2018-09-18 | End: 2022-05-04

## 2022-05-04 RX ORDER — CHLORHEXIDINE GLUCONATE 4 %
325 (65 FE) LIQUID (ML) TOPICAL TWICE DAILY
Refills: 0 | Status: DISCONTINUED | COMMUNITY
Start: 2020-01-07 | End: 2022-05-04

## 2022-05-04 RX ORDER — CEFUROXIME AXETIL 500 MG/1
500 TABLET ORAL
Qty: 4 | Refills: 0 | Status: DISCONTINUED | COMMUNITY
Start: 2020-01-07 | End: 2022-05-04

## 2022-05-04 RX ORDER — AMLODIPINE BESYLATE 5 MG/1
5 TABLET ORAL
Refills: 0 | Status: DISCONTINUED | COMMUNITY
End: 2022-05-04

## 2022-05-04 RX ORDER — ALPRAZOLAM 0.5 MG/1
0.5 TABLET ORAL 3 TIMES DAILY
Refills: 0 | Status: DISCONTINUED | COMMUNITY
End: 2022-05-04

## 2022-05-04 RX ORDER — UBIDECARENONE/VIT E ACET 100MG-5
CAPSULE ORAL
Refills: 0 | Status: DISCONTINUED | COMMUNITY
End: 2022-05-04

## 2022-06-13 ENCOUNTER — NON-APPOINTMENT (OUTPATIENT)
Age: 87
End: 2022-06-13

## 2022-06-27 ENCOUNTER — APPOINTMENT (OUTPATIENT)
Dept: DERMATOLOGY | Facility: CLINIC | Age: 87
End: 2022-06-27

## 2022-07-15 ENCOUNTER — NON-APPOINTMENT (OUTPATIENT)
Age: 87
End: 2022-07-15

## 2022-09-29 ENCOUNTER — APPOINTMENT (OUTPATIENT)
Dept: DERMATOLOGY | Facility: CLINIC | Age: 87
End: 2022-09-29

## 2022-09-29 PROCEDURE — 99213 OFFICE O/P EST LOW 20 MIN: CPT | Mod: 25

## 2022-09-29 PROCEDURE — 17003 DESTRUCT PREMALG LES 2-14: CPT

## 2022-09-29 PROCEDURE — 17000 DESTRUCT PREMALG LESION: CPT

## 2022-10-27 ENCOUNTER — APPOINTMENT (OUTPATIENT)
Dept: CARDIOLOGY | Facility: CLINIC | Age: 87
End: 2022-10-27

## 2022-11-22 NOTE — PATIENT PROFILE ADULT - HAVE YOU EXPERIENCED A TRAUMATIC EVENT?
[facility] Surg  Brief Operative Note    SUMMARY     Surgery Date: 11/22/2022     Surgeon(s) and Role:     * KETAN Schmitz MD - Primary    Assisting Surgeon: None    Pre-op Diagnosis:  Pyelonephritis [N12]  Obstructive uropathy [N13.9]    Post-op Diagnosis:  Post-Op Diagnosis Codes:     * Pyelonephritis [N12]     * Obstructive uropathy [N13.9]    Procedure(s) (LRB):  CYSTOSCOPY, WITH URETERAL STENT INSERTION (Right)    Anesthesia: General    Operative Findings: wire passed beyond stones, slightly cloudy urine drained, 6 x 26 cm JJ stent without string placed.  Burgos replaced    Estimated Blood Loss: * No values recorded between 11/22/2022 12:09 PM and 11/22/2022 12:22 PM *    Estimated Blood Loss has been documented.         Specimens:   Specimen (24h ago, onward)      None            [id number]       no

## 2023-08-01 ENCOUNTER — APPOINTMENT (OUTPATIENT)
Dept: DERMATOLOGY | Facility: CLINIC | Age: 88
End: 2023-08-01

## 2023-08-15 ENCOUNTER — APPOINTMENT (OUTPATIENT)
Dept: DERMATOLOGY | Facility: CLINIC | Age: 88
End: 2023-08-15
Payer: MEDICARE

## 2023-08-15 PROCEDURE — 99213 OFFICE O/P EST LOW 20 MIN: CPT

## 2023-09-25 ENCOUNTER — APPOINTMENT (OUTPATIENT)
Dept: DERMATOLOGY | Facility: CLINIC | Age: 88
End: 2023-09-25

## 2023-10-18 ENCOUNTER — APPOINTMENT (OUTPATIENT)
Dept: DERMATOLOGY | Facility: CLINIC | Age: 88
End: 2023-10-18

## 2024-01-19 PROBLEM — H10.433 ALLERGIC CONJUNCTIVITIS ; RIGHT EYE: Status: ACTIVE | Noted: 2024-01-19

## 2024-01-29 ENCOUNTER — OFFICE (OUTPATIENT)
Dept: URBAN - METROPOLITAN AREA CLINIC 100 | Facility: CLINIC | Age: 89
Setting detail: OPHTHALMOLOGY
End: 2024-01-29
Payer: MEDICARE

## 2024-01-29 DIAGNOSIS — H43.811: ICD-10-CM

## 2024-01-29 DIAGNOSIS — H18.513: ICD-10-CM

## 2024-01-29 DIAGNOSIS — D31.31: ICD-10-CM

## 2024-01-29 DIAGNOSIS — H35.3131: ICD-10-CM

## 2024-01-29 DIAGNOSIS — H01.001: ICD-10-CM

## 2024-01-29 DIAGNOSIS — H53.001: ICD-10-CM

## 2024-01-29 DIAGNOSIS — H52.4: ICD-10-CM

## 2024-01-29 DIAGNOSIS — H18.232: ICD-10-CM

## 2024-01-29 DIAGNOSIS — Z96.1: ICD-10-CM

## 2024-01-29 DIAGNOSIS — H16.223: ICD-10-CM

## 2024-01-29 DIAGNOSIS — I63.50: ICD-10-CM

## 2024-01-29 DIAGNOSIS — H01.004: ICD-10-CM

## 2024-01-29 PROCEDURE — 92250 FUNDUS PHOTOGRAPHY W/I&R: CPT | Performed by: OPHTHALMOLOGY

## 2024-01-29 PROCEDURE — 92015 DETERMINE REFRACTIVE STATE: CPT | Performed by: OPHTHALMOLOGY

## 2024-01-29 PROCEDURE — 92014 COMPRE OPH EXAM EST PT 1/>: CPT | Performed by: OPHTHALMOLOGY

## 2024-01-29 ASSESSMENT — REFRACTION_AUTOREFRACTION
OS_CYLINDER: -4.25
OS_SPHERE: +0.25
OD_SPHERE: +0.25
OD_AXIS: 083
OS_AXIS: 090
OD_CYLINDER: -1.25

## 2024-01-29 ASSESSMENT — REFRACTION_MANIFEST
OS_SPHERE: +0.50
OD_SPHERE: +0.50
OD_SPHERE: +0.50
OS_VA1: 20/100
OS_VA1: 20/100
OD_AXIS: 085
OS_CYLINDER: -4.25
OU_VA: 20/100-
OS_ADD: +3.00
OS_AXIS: 90
OD_ADD: +3.00
OD_CYLINDER: -1.25
OS_CYLINDER: -5.00
OS_CYLINDER: -4.25
OD_CYLINDER: -1.25
OS_AXIS: 090
OS_AXIS: 090
OD_CYLINDER: -1.50
OD_VA1: 20/50-1
OD_VA1: 20/50-1
OD_AXIS: 085
OS_ADD: +3.00
OS_SPHERE: +0.50
OS_SPHERE: +1.75
OS_VA1: 20/NI
OD_AXIS: 120
OD_SPHERE: +1.50
OD_ADD: +3.00
OD_VA1: 20/50-

## 2024-01-29 ASSESSMENT — SPHEQUIV_DERIVED
OD_SPHEQUIV: -0.375
OS_SPHEQUIV: -1.875
OS_SPHEQUIV: -0.75
OS_SPHEQUIV: -1.625
OD_SPHEQUIV: -0.125
OS_SPHEQUIV: -1.625
OD_SPHEQUIV: 0.75
OD_SPHEQUIV: -0.125

## 2024-01-29 ASSESSMENT — SUPERFICIAL PUNCTATE KERATITIS (SPK)
OS_SPK: T
OD_SPK: T

## 2024-01-29 ASSESSMENT — CORNEAL DYSTROPHY - POSTERIOR
OD_POSTERIORDYSTROPHY: 2+ 3+ GUTTATA
OS_POSTERIORDYSTROPHY: 3+ GUTTATA

## 2024-01-29 ASSESSMENT — REFRACTION_CURRENTRX
OD_CYLINDER: -0.75
OS_VPRISM_DIRECTION: SV
OS_VPRISM_DIRECTION: PROGS
OS_CYLINDER: -1.00
OS_ADD: +3.00
OD_AXIS: 100
OD_OVR_VA: 20/
OS_OVR_VA: 20/
OD_VPRISM_DIRECTION: SV
OS_AXIS: 085
OS_SPHERE: +1.75
OS_ADD: +2.75
OD_ADD: +3.00
OD_VPRISM_DIRECTION: PROGS
OD_SPHERE: +0.50
OD_ADD: +2.75
OS_OVR_VA: 20/
OD_OVR_VA: 20/

## 2024-01-29 ASSESSMENT — CONFRONTATIONAL VISUAL FIELD TEST (CVF)
OS_FINDINGS: FULL
OD_FINDINGS: FULL

## 2024-01-29 ASSESSMENT — LID EXAM ASSESSMENTS
OS_BLEPHARITIS: LUL 1+
OD_BLEPHARITIS: RUL 1+

## 2025-02-19 ENCOUNTER — APPOINTMENT (OUTPATIENT)
Dept: DERMATOLOGY | Facility: CLINIC | Age: 89
End: 2025-02-19
Payer: MEDICARE

## 2025-02-19 PROCEDURE — 17000 DESTRUCT PREMALG LESION: CPT

## 2025-02-19 PROCEDURE — 99212 OFFICE O/P EST SF 10 MIN: CPT | Mod: 25

## 2025-02-19 PROCEDURE — 17003 DESTRUCT PREMALG LES 2-14: CPT

## 2025-03-31 PROBLEM — H35.3132 AGE RELATED MACULAR DEGENERATION DRY; BOTH EYES INTERMEDIATE: Status: ACTIVE | Noted: 2025-03-19

## 2025-05-22 NOTE — PHYSICAL THERAPY INITIAL EVALUATION ADULT - LIVES WITH, PROFILE
[facility]    [address]  [address] [place] [phone]  You also can go to the website and some providers allow you to book online, see below  [email or web address]       None children/Dtr, pt has 3 steps to enter house with rails, pt has flight of stairs to bedroom with rails/spouse

## 2025-06-05 NOTE — PROGRESS NOTE ADULT - SUBJECTIVE AND OBJECTIVE BOX
Problem: Adult Inpatient Plan of Care  Goal: Plan of Care Review  Outcome: Progressing  Goal: Patient-Specific Goal (Individualized)  Outcome: Progressing  Goal: Absence of Hospital-Acquired Illness or Injury  Outcome: Progressing  Goal: Optimal Comfort and Wellbeing  Outcome: Progressing  Goal: Readiness for Transition of Care  Outcome: Progressing     Problem: Pneumonia  Goal: Fluid Balance  Outcome: Progressing  Goal: Resolution of Infection Signs and Symptoms  Outcome: Progressing  Goal: Effective Oxygenation and Ventilation  Outcome: Progressing     Problem: Wound  Goal: Optimal Coping  Outcome: Progressing  Goal: Optimal Functional Ability  Outcome: Progressing  Goal: Absence of Infection Signs and Symptoms  Outcome: Progressing  Goal: Improved Oral Intake  Outcome: Progressing  Goal: Optimal Pain Control and Function  Outcome: Progressing  Goal: Skin Health and Integrity  Outcome: Progressing  Goal: Optimal Wound Healing  Outcome: Progressing     Problem: Infection  Goal: Absence of Infection Signs and Symptoms  Outcome: Progressing     Problem: Fall Injury Risk  Goal: Absence of Fall and Fall-Related Injury  Outcome: Progressing     Problem: Skin Injury Risk Increased  Goal: Skin Health and Integrity  Outcome: Progressing      Subjective:    pat better, sitting in chair.    Home Medications:  ALPRAZolam 0.5 mg oral tablet: 1 tab(s) orally 3 times a day (31 Dec 2019 20:13)  Aspir 81 oral delayed release tablet: 1 tab(s) orally once a day (31 Dec 2019 20:13)  azelastine 137 mcg/inh (0.1%) nasal spray: 1 spray(s) nasal 2 times a day, As Needed (31 Dec 2019 20:13)  hydrALAZINE 25 mg oral tablet: 1 tab(s) orally 2 times a day (31 Dec 2019 20:13)  lisinopril 20 mg oral tablet: 1 tab(s) orally once a day (31 Dec 2019 20:13)  pantoprazole 40 mg oral delayed release tablet: 1 tab(s) orally once a day (31 Dec 2019 20:13)  Refresh ophthalmic solution: 1 drop(s) to each affected eye 3 times a day, As Needed (31 Dec 2019 20:13)    MEDICATIONS  (STANDING):  aspirin enteric coated 81 milliGRAM(s) Oral daily  atorvastatin 10 milliGRAM(s) Oral at bedtime  azithromycin   Tablet 500 milliGRAM(s) Oral daily  cefepime  Injectable. 1000 milliGRAM(s) IV Push every 12 hours  hydrALAZINE 25 milliGRAM(s) Oral two times a day  ipratropium    for Nebulization 500 MICROGram(s) Nebulizer every 6 hours  lisinopril 20 milliGRAM(s) Oral daily  pantoprazole    Tablet 40 milliGRAM(s) Oral before breakfast  sodium chloride 0.9%. 1000 milliLiter(s) (75 mL/Hr) IV Continuous <Continuous>    MEDICATIONS  (PRN):  acetaminophen   Tablet .. 650 milliGRAM(s) Oral every 6 hours PRN Mild Pain (1 - 3)  ALPRAZolam 0.25 milliGRAM(s) Oral three times a day PRN anxiety  aluminum hydroxide/magnesium hydroxide/simethicone Suspension 30 milliLiter(s) Oral every 6 hours PRN Dyspepsia  artificial  tears Solution 1 Drop(s) Both EYES three times a day PRN Dry Eyes      Allergies    antihistamines (Unknown)  IV Contrast (Unknown)  PC Pen VK (Unknown)  sulfa drugs (Unknown)    Intolerances    predniSONE (Unknown)      Vital Signs Last 24 Hrs  T(C): 36.4 (2020 10:58), Max: 36.8 (2020 20:34)  T(F): 97.5 (2020 10:58), Max: 98.3 (2020 20:34)  HR: 107 (2020 10:58) (76 - 122)  BP: 146/73 (2020 10:58) (133/57 - 166/65)  BP(mean): --  RR: 19 (2020 10:58) (17 - 19)  SpO2: 100% (2020 10:58) (96% - 100%)      PHYSICAL EXAMINATION:    NECK:  Supple. No lymphadenopathy. Jugular venous pressure not elevated. Carotids equal.   HEART:   The cardiac impulse has a normal quality. Reg., Nl S1 and S2.  There are no murmurs, rubs or gallops noted  CHEST:  Chest crackles to auscultation. Normal respiratory effort.  ABDOMEN:  Soft and nontender.   EXTREMITIES:  There is no edema.       LABS:                        9.5    5.20  )-----------( 237      ( 2020 07:46 )             30.0         136  |  107  |  6<L>  ----------------------------<  86  3.5   |  24  |  0.63    Ca    9.1      2020 07:46  Phos  2.9     -  Mg     2.0         TPro  5.8<L>  /  Alb  2.2<L>  /  TBili  0.3  /  DBili  x   /  AST  10<L>  /  ALT  11<L>  /  AlkPhos  96      PT/INR - ( 2020 07:46 )   PT: 14.2 sec;   INR: 1.27 ratio         PTT - ( 2020 07:46 )  PTT:27.4 sec  Urinalysis Basic - ( 31 Dec 2019 18:05 )    Color: Yellow / Appearance: Clear / S.005 / pH: x  Gluc: x / Ketone: Negative  / Bili: Negative / Urobili: Negative mg/dL   Blood: x / Protein: Negative mg/dL / Nitrite: Negative   Leuk Esterase: Trace / RBC: 0-2 /HPF / WBC 0-2   Sq Epi: x / Non Sq Epi: x / Bacteria: x        CT Chest No Cont (20 @ 13:53) >  IMPRESSION:   Left lower lobe infiltrate  Emphysema  Focal nodular scarring in the right apex  Suggestion of left thyroid nodule. Recommend correlation with thyroid ultrasound on a nonemergent basis. Thyroid nodule is noted on prior CT studies.  Small bilateral pleural effusions  Small pericardial effusion